# Patient Record
Sex: FEMALE | Race: WHITE | NOT HISPANIC OR LATINO | Employment: UNEMPLOYED | URBAN - METROPOLITAN AREA
[De-identification: names, ages, dates, MRNs, and addresses within clinical notes are randomized per-mention and may not be internally consistent; named-entity substitution may affect disease eponyms.]

---

## 2020-11-23 ENCOUNTER — HOSPITAL ENCOUNTER (OUTPATIENT)
Dept: URGENT CARE | Facility: CLINIC | Age: 45
Discharge: HOME | End: 2020-11-23

## 2020-11-26 LAB — BACTERIA UR CULT: NORMAL

## 2021-01-09 ENCOUNTER — HOSPITAL ENCOUNTER (OUTPATIENT)
Dept: URGENT CARE | Facility: CLINIC | Age: 46
Discharge: HOME | End: 2021-01-09
Attending: PHYSICIAN ASSISTANT

## 2021-01-11 LAB — SARS-COV-2 RNA SPEC QL NAA+PROBE: NOT DETECTED

## 2021-02-09 ENCOUNTER — HOSPITAL ENCOUNTER (OUTPATIENT)
Dept: FAMILY MEDICINE CLINIC | Facility: CLINIC | Age: 46
Discharge: HOME | End: 2021-02-09
Attending: FAMILY MEDICINE

## 2021-02-09 ENCOUNTER — OFFICE VISIT CONVERTED (OUTPATIENT)
Dept: FAMILY MEDICINE CLINIC | Facility: CLINIC | Age: 46
End: 2021-02-09
Attending: FAMILY MEDICINE

## 2021-02-09 LAB
ANION GAP SERPL CALC-SCNC: 12 MMOL/L (ref 8–19)
BASOPHILS # BLD AUTO: 0.09 10*3/UL (ref 0–0.2)
BASOPHILS NFR BLD AUTO: 1.4 % (ref 0–3)
BUN SERPL-MCNC: 10 MG/DL (ref 5–25)
BUN/CREAT SERPL: 13 {RATIO} (ref 6–20)
CALCIUM SERPL-MCNC: 9.6 MG/DL (ref 8.7–10.4)
CHLORIDE SERPL-SCNC: 96 MMOL/L (ref 99–111)
CHOLEST SERPL-MCNC: 170 MG/DL (ref 107–200)
CHOLEST/HDLC SERPL: 4.3 {RATIO} (ref 3–6)
CONV ABS IMM GRAN: 0.02 10*3/UL (ref 0–0.2)
CONV CO2: 30 MMOL/L (ref 22–32)
CONV HIV-1/ HIV-2: NONREACTIVE
CONV IMMATURE GRAN: 0.3 % (ref 0–1.8)
CREAT UR-MCNC: 0.79 MG/DL (ref 0.5–0.9)
DEPRECATED RDW RBC AUTO: 53.3 FL (ref 36.4–46.3)
EOSINOPHIL # BLD AUTO: 0.24 10*3/UL (ref 0–0.7)
EOSINOPHIL # BLD AUTO: 3.7 % (ref 0–7)
ERYTHROCYTE [DISTWIDTH] IN BLOOD BY AUTOMATED COUNT: 15.7 % (ref 11.7–14.4)
EST. AVERAGE GLUCOSE BLD GHB EST-MCNC: 117 MG/DL
GFR SERPLBLD BASED ON 1.73 SQ M-ARVRAT: >60 ML/MIN/{1.73_M2}
GLUCOSE SERPL-MCNC: 109 MG/DL (ref 65–99)
HBA1C MFR BLD: 5.7 % (ref 3.5–5.7)
HCT VFR BLD AUTO: 43.2 % (ref 37–47)
HDLC SERPL-MCNC: 40 MG/DL (ref 40–60)
HGB BLD-MCNC: 13.9 G/DL (ref 12–16)
LDLC SERPL CALC-MCNC: 82 MG/DL (ref 70–100)
LYMPHOCYTES # BLD AUTO: 1.93 10*3/UL (ref 1–5)
LYMPHOCYTES NFR BLD AUTO: 29.5 % (ref 20–45)
MAGNESIUM SERPL-MCNC: 1.84 MG/DL (ref 1.6–2.3)
MCH RBC QN AUTO: 29.8 PG (ref 27–31)
MCHC RBC AUTO-ENTMCNC: 32.2 G/DL (ref 33–37)
MCV RBC AUTO: 92.7 FL (ref 81–99)
MONOCYTES # BLD AUTO: 0.62 10*3/UL (ref 0.2–1.2)
MONOCYTES NFR BLD AUTO: 9.5 % (ref 3–10)
NEUTROPHILS # BLD AUTO: 3.65 10*3/UL (ref 2–8)
NEUTROPHILS NFR BLD AUTO: 55.6 % (ref 30–85)
NRBC CBCN: 0 % (ref 0–0.7)
OSMOLALITY SERPL CALC.SUM OF ELEC: 280 MOSM/KG (ref 273–304)
PLATELET # BLD AUTO: 324 10*3/UL (ref 130–400)
PMV BLD AUTO: 11.5 FL (ref 9.4–12.3)
POTASSIUM SERPL-SCNC: 3.4 MMOL/L (ref 3.5–5.3)
RBC # BLD AUTO: 4.66 10*6/UL (ref 4.2–5.4)
SODIUM SERPL-SCNC: 135 MMOL/L (ref 135–147)
TRIGL SERPL-MCNC: 239 MG/DL (ref 40–150)
TSH SERPL-ACNC: 2.81 M[IU]/L (ref 0.27–4.2)
VLDLC SERPL-MCNC: 48 MG/DL (ref 5–37)
WBC # BLD AUTO: 6.55 10*3/UL (ref 4.8–10.8)

## 2021-02-10 LAB — HCV AB SER DONR QL: <0.1 S/CO RATIO (ref 0–0.9)

## 2021-02-17 ENCOUNTER — OFFICE VISIT CONVERTED (OUTPATIENT)
Dept: FAMILY MEDICINE CLINIC | Facility: CLINIC | Age: 46
End: 2021-02-17
Attending: FAMILY MEDICINE

## 2021-05-07 NOTE — PROGRESS NOTES
Progress Note      Patient Name: Magi Neumann   Patient ID: [id number]   Sex: Female   Birthdate: Emily 3, 1975    Primary Care Provider: Stephany Flores DO   Referring Provider: Stephany Flores DO    Visit Date: 2021    Provider: Stephany Flores DO   Location: [facility]   Location Address: [address] [place] [address] [place]  [address]   Location Phone: [phone]          Chief Complaint     Follow up to rash       History Of Present Illness  Magi Neumann is a 45 year old /White female who presents for evaluation and treatment of:      1.) F/U LABS : The patient presents for follow up - most recent visit was a new patient visit. She presented with complaints of bilateral lower extremity numbness and tingling of her feet - she reported concern for diabetes. Review of her labs revealed blood glucose within normal limits. Her remaining labs were unremarkable except for an elevated triglyceride level + a potassium level of 3.4. Patient reports a ? history of hypokalemia - reporting 'I have been in the hospital over 50 times because my potassium was low.' She reported potassium supplementation during her last visit here in office - she was advised that she would not be started on a K supplement until labs were resulted.       Past Medical History  Disease Name Date Onset Notes   Chronic pancreatitis --  History of pancreatic stent per patient due to gallstone lodge in pancreatic duct.         Past Surgical History  Procedure Name Date Notes    --  x 2.    D and C --  ?Malignant cells   Gallbladder removal --  --    Tubal ligation --  --          Medication List  Name Date Started Instructions   hydroxyzine HCl 25 mg oral tablet 2021 take 1 tablet by oral route 2 times a day for 30 days         Allergy List  Allergen Name Date Reaction Notes   Diflucan --  --  --          Family Medical History  Disease Name Relative/Age Notes   Brain Neoplasm, Malignant Father/   --   "  Lung cancer Father/   --    Diabetes mellitus, type II Brother/  Mother/  Sister/   --          Social History  Finding Status Start/Stop Quantity Notes   Tobacco Current every day 14/-- 1 PPD --          Review of Systems  · Constitutional  o Denies  o : fever, chills  · Eyes  o Denies  o : discharge from eye, eye discomfort  · HENT  o Denies  o : lightheadedness, nasal congestion  · Cardiovascular  o Denies  o : chest pain, syncope  · Respiratory  o Denies  o : wheezing, cough  · Gastrointestinal  o Denies  o : nausea, vomiting  · Neurologic  o Denies  o : altered mental status, tingling or numbness  · Musculoskeletal  o Denies  o : muscle pain, muscle cramps  · Psychiatric  o Denies  o : depression, hallucinations      Vitals  Date Time BP Position Site L\R Cuff Size HR RR TEMP (F) WT  HT  BMI kg/m2 BSA m2 O2 Sat FR L/min FiO2 HC       02/17/2021 01:13 /68 Sitting    80 - R  97.7 135lbs 0oz 5'  4.5\" 22.81 1.67 98 %  21%          Physical Examination  · Constitutional  o Appearance  o : alert, in no acute distress, dirty appearance   · Eyes  o Conjunctivae  o : conjunctivae normal  · Neck  o Inspection/Palpation  o : supple  · Respiratory  o Respiratory Effort  o : breathing unlabored  o Auscultation of Lungs  o : clear to ascultation  · Cardiovascular  o Heart  o :   § Auscultation of Heart  § : regular rate and rhythm  o Peripheral Vascular System  o :   § Extremities  § : no edema  · Skin and Subcutaneous Tissue  o General Inspection  o : no rash appreciated   · Neurologic  o Gait and Station  o :   § Gait Screening  § : normal gait  · Psychiatric  o Mood and Affect  o : mood normal, affect appropriate          Assessment  · Hypertriglyceridemia     272.1/E78.1    A.) Advised a healthy diet and exercise.     · Hypokalemia     276.8/E87.6    A.) Will start on low dose K supplementation.     · Muscle cramps     729.82/R25.2    A.) Along with numbness and tingling the patient is c/o of muscle cramps - " muscle relaxer as noted. Recommend referral to neurology for parasthesia sxs, which the patient declined.         Plan  · Orders  o ACO-17: Screened for tobacco use AND received tobacco cessation intervention (4004F) - - 02/17/2021  o ACO-39: Current medications updated and reviewed (, 1159F) - - 02/17/2021  · Medications  o potassium chloride 8 mEq oral tablet extended release   SIG: take 1 tablet (8 meq) by oral route once daily for 90 days   DISP: (90) Tablet with 1 refills  Prescribed on 02/17/2021     o cyclobenzaprine 5 mg oral tablet   SIG: take 1 tablet by oral route daily PRN   DISP: (60) Tablet with 1 refills  Prescribed on 02/17/2021     o Medications have been Reconciled  o Transition of Care or Provider Policy  · Instructions  o Take all medications as prescribed/directed.  o Patient was educated/instructed on their diagnosis, treatment and medications prior to discharge from the clinic today.  o FOLLOW UP IN 6 MONTHS.            Electronically Signed by: Stephany Flores DO - on February 17, 2021 02:38:59 PM

## 2021-05-07 NOTE — PROGRESS NOTES
"   Progress Note      Patient Name: Magi Neumann   Patient ID: [id number]   Sex: Female   Birthdate: Emily 3, 1975        Visit Date: 2021    Provider: Stephany Flores DO   Location: [facility]   Location Address: [address] [place] [address] [place]  [address]   Location Phone: [phone]          Chief Complaint     Rash all over for a \"very long time\" and has issues with legs/feet burning/pain/tingling.       History Of Present Illness  Magi Neumann is a 45 year old /White female who presents for evaluation and treatment of:      1.) ESTABLISH CARE : No recent primary care. Presents with a past medical history of chronic pancreatitis.    2.) RASH : Onset 1 year ago. Diffuse. Multiple trips to the ER - per patient, she was treated with creams (?abx). She reports intermittent relief. (+) Pruritic lesion. She reports multiple showers a day due to pruritus - she is unclear regarding showers provided symptomatic. Reports improvement of her sxs when she 'leaves her house.'     3.) ?NEUROPATHIC PAIN OF LE : Onset - months ago. Reports sxs of bilateral feet and legs. Reports intemittent cramps and 'knots.' Reports intermittent numbness of the plantar aspect of her bilateral feet.     4.)  PREVENTIVE CARE : . Most recent pap test was completed 5 years ago with eventual D & C. Due for labs.       Past Medical History  Disease Name Date Onset Notes   Chronic pancreatitis --  History of pancreatic stent per patient due to gallstone lodge in pancreatic duct.         Past Surgical History  Procedure Name Date Notes    --  x 2.    D and C --  ?Malignant cells   Gallbladder removal --  --    Tubal ligation --  --          Allergy List  Allergen Name Date Reaction Notes   Diflucan --  --  --        Allergies Reconciled  Social History  Finding Status Start/Stop Quantity Notes   Tobacco Current every day 14/-- 1 PPD --          Review of Systems  · Constitutional  o Denies  o : " "fatigue, night sweats  · Eyes  o Denies  o : double vision, blurred vision  · HENT  o Denies  o : vertigo, recent head injury  · Cardiovascular  o Denies  o : chest pain, irregular heart beats  · Respiratory  o Denies  o : shortness of breath, productive cough  · Gastrointestinal  o Denies  o : nausea, vomiting  · Genitourinary  o Denies  o : dysuria, urinary retention  · Integument  o Admits  o : rash, itching  · Neurologic  o Denies  o : altered mental status, seizures  · Musculoskeletal  o Admits  o : bilateral foot pain, neuropathic pain of b/l lower extremities  o Denies  o : joint swelling, limitation of motion  · Endocrine  o Denies  o : cold intolerance, heat intolerance  · Psychiatric  o Admits  o : anxiety, depression, difficulty sleeping  · Heme-Lymph  o Denies  o : petechiae, lymph node enlargement or tenderness  · Allergic-Immunologic  o Denies  o : frequent illnesses      Vitals  Date Time BP Position Site L\R Cuff Size HR RR TEMP (F) WT  HT  BMI kg/m2 BSA m2 O2 Sat FR L/min FiO2        02/09/2021 10:13 /80 Sitting    79 - R  97.1 139lbs 2oz 5'  4.5\" 23.51 1.69 97 %  21%          Physical Examination  · Constitutional  o Appearance  o : alert, in no acute distress, normal body habitus  · Eyes  o Conjunctivae  o : conjunctivae normal  · Ears, Nose, Mouth and Throat  o Ears  o :   § External Ears  § : appearance within normal limits, no lesions present  § Otoscopic Examination  § : tympanic membrane appearance within normal limits bilaterally  § Hearing  § : intact to conversational voice both ears  o Nose  o :   § External Nose  § : appearance normal  o Oral Cavity  o :   § Oral Mucosa  § : oral mucosa normal without pallor or cyanosis  § Lips  § : lip appearance normal  § Teeth  § : normal dentition for age  § Gums  § : gums pink, non-swollen, no bleeding present  § Tongue  § : tongue appearance normal  § Palate  § : hard palate normal, soft palate appearance normal  o Throat  o : "   § Oropharynx  § : no inflammation appreciated  · Neck  o Inspection/Palpation  o : supple  · Respiratory  o Respiratory Effort  o : breathing unlabored  o Auscultation of Lungs  o : clear to ascultation  · Cardiovascular  o Heart  o :   § Auscultation of Heart  § : regular rate and rhythm  o Peripheral Vascular System  o :   § Extremities  § : no edema  · Musculoskeletal  o General  o :   § General Musculoskeletal  § : no joint swelling appreciated   · Skin and Subcutaneous Tissue  o General Inspection  o : diffuse lesions appreciated - erythematous w/ skin breakdown - at least one irregular hyperpigmented lesion of right upper back region measuring approximately .5 cm in diameter  · Neurologic  o Gait and Station  o :   § Gait Screening  § : normal gait  · Psychiatric  o Mood and Affect  o : bizarre              Assessment  · Dermatitis     692.9/L30.9    A.) Hydroxyzine as noted - will refer to dermatology for an evaluation and recommendations.     · Insomnia, unspecified     780.52/G47.00    A.) Will workup during next visit.     · Establishing care with new doctor, encounter for     V65.8/Z76.89    A.) Labs as noted below. Follow up in 1 week for review.     · Neuropathic pain     729.2/M79.2    A.) A1C as noted.     · Screening for diabetes mellitus     V77.1/Z13.1    A.) A1C as noted.     · Screening for thyroid disorder     V77.0/Z13.29    A.) TSH as noted.     · Encounter for hepatitis C screening test for low risk patient     V73.89/Z11.59    A.) Hepatitis C antibody as noted.     · Screening for lipid disorders     V77.91/Z13.220    A.) Lipid panel as noted.     · Screening for cervical cancer     V76.2/Z12.4    A.) Schedule Well Woman Exam.     · Medication management     V58.69/Z79.899    A.) Labs as noted.     · Screening for HIV (human immunodeficiency virus)     V73.89/Z11.4    A.) HIV screening test as noted.     · Anxiety and depression       Anxiety disorder, unspecified     300.00/F41.9  Major  depressive disorder, single episode, unspecified     300.00/F32.9    A.) Start Hydroxyzine - will discuss further during follow up visit.     · Allergic dermatitis     692.9/L23.9    A.) Will refer to allergy for an evaluation and recommendations.     · Bilateral foot pain       Pain in right foot     729.5/M79.671  Pain in left foot     729.5/M79.672    A.) Will refer to podiatry for an evaluation and recommendations.      Problems Reconciled  Plan  · Orders  o BMP [facility] ([id number]) - V58.69/Z79.899 - 02/09/2021  o CBC with Auto Diff [facility] ([id number]) - V58.69/Z79.899 - 02/09/2021  o Hgb A1c [facility] ([id number]) - V77.1/Z13.1 - 02/09/2021  o Lipid Panel [facility] ([id number]) - V77.91/Z13.220 - 02/09/2021  o TSH [facility] ([id number]) - V77.0/Z13.29 - 02/09/2021  o ACO-39: Current medications updated and reviewed (1159F, ) - - 02/09/2021  o Hepatitis C antibody (44214) - V73.89/Z11.59 - 02/09/2021  o HIV Screen by EIA [facility] ([id number], ) - V73.89/Z11.4 - 02/09/2021  o Magnesium level (39930) - 729.2/M79.2 - 02/09/2021  o DERMATOLOGY CONSULTATION (DERMA) - 692.9/L30.9 - 02/09/2021  o ALLERGY CONSULTATION (ALLEG) - 692.9/L23.9 - 02/09/2021  o PODIATRY CONSULTATION (PODIA) - 729.5/M79.671, 729.5/M79.672 - 02/09/2021  · Medications  o hydroxyzine HCl 25 mg oral tablet   SIG: take 1 tablet by oral route 2 times a day for 30 days   DISP: (60) Tablet with 0 refills  Prescribed on 02/09/2021     o Medications have been Reconciled  o Transition of Care or Provider Policy  · Instructions  o Take all medications as prescribed/directed.  o Patient was educated/instructed on their diagnosis, treatment and medications prior to discharge from the clinic today.  o FOLLOW UP IN 1 MONTH.            Electronically Signed by: Stephany Flores DO - on February 9, 2021 11:36:02 AM

## 2021-05-09 VITALS
WEIGHT: 135 LBS | HEART RATE: 80 BPM | SYSTOLIC BLOOD PRESSURE: 110 MMHG | OXYGEN SATURATION: 98 % | BODY MASS INDEX: 23.05 KG/M2 | HEIGHT: 64 IN | TEMPERATURE: 97.7 F | DIASTOLIC BLOOD PRESSURE: 68 MMHG

## 2021-05-09 VITALS
TEMPERATURE: 97.1 F | WEIGHT: 139.12 LBS | BODY MASS INDEX: 23.75 KG/M2 | SYSTOLIC BLOOD PRESSURE: 118 MMHG | HEART RATE: 79 BPM | HEIGHT: 64 IN | OXYGEN SATURATION: 97 % | DIASTOLIC BLOOD PRESSURE: 80 MMHG

## 2021-05-11 ENCOUNTER — HOSPITAL ENCOUNTER (OUTPATIENT)
Dept: FAMILY MEDICINE CLINIC | Facility: CLINIC | Age: 46
Discharge: HOME | End: 2021-05-11
Attending: NURSE PRACTITIONER

## 2021-05-11 ENCOUNTER — OFFICE VISIT CONVERTED (OUTPATIENT)
Dept: FAMILY MEDICINE CLINIC | Facility: CLINIC | Age: 46
End: 2021-05-11
Attending: NURSE PRACTITIONER

## 2021-06-05 NOTE — PROGRESS NOTES
"   Progress Note      Patient Name: Magi Neumann   Patient ID: [id number]   Sex: Female   Birthdate: Emily 3, 1975    Primary Care Provider: Stephany Flores DO   Referring Provider: Stephany Flores DO    Visit Date: May 11, 2021    Provider: YULI Orosco   Location: [facility]   Location Address: [address] [place] [address]   [place]  [address]   Location Phone: [phone]          Chief Complaint     PATIENT IS HERE FOR A RASH ALL OVER HER BODY, SHE HAS AN APT WITH A DERM SPECALISTY BUT NOT TIL THE END OF JUNE.      SHE IS ALSO HAVING LIMITED MOVEMENT, PAIN, SWELLING AND PAIN SHOOTING DOWN HER LEFT ARM FROM HER LEFT SHOULDER.       History Of Present Illness  Magi Neumann is a 45 year old /White female who presents for evaluation and treatment of:      1) pt reports has a rash \"everywhere on my face hands arms and scarred up and buttocks and butt crack and blistered and infected\"--and reports burning --pt reports had this on and off x 1 yr--never seen derm for this as yet and does have an appoint end of next month--6/22/21--no one else with the rash in the home --does have indoor an outdoor pets     pt reports been to the ER several times and \"they never know what it is\"     and the allergist 6/9/21    2) pt also reports 1-2 weeks the left shoulder pain and \"feels like out of socket\"--pt denies an injury--just awakened with it --IB and some Tylenol and heat pad--but does have 3 grandchildren at home she has custody of and does lift them       and also will need refills on the flexeril --    3) pt reports was supposed to be in court yesterday in [facility] for a pre-trial for the grandchildren--and pt reports was unable to drive and now they rescheduled her to be there tomorrow and  will  her there--pt then reports needing a note for this missed court date          Past Medical History  Disease Name Date Onset Notes   Chronic pancreatitis --  History of pancreatic stent per patient " "due to gallstone lodge in pancreatic duct.         Past Surgical History  Procedure Name Date Notes    --  x 2.    D and C --  ?Malignant cells   Gallbladder removal --  --    Tubal ligation --  --          Medication List  Name Date Started Instructions   cyclobenzaprine 5 mg oral tablet 2021 take 1 tablet by oral route daily PRN   hydroxyzine HCl 25 mg oral tablet 2021 take 1 tablet by oral route 2 times a day for 30 days   potassium chloride 8 mEq oral tablet extended release 2021 take 1 tablet (8 meq) by oral route once daily for 90 days         Allergy List  Allergen Name Date Reaction Notes   Diflucan --  --  --        Allergies Reconciled  Family Medical History  Disease Name Relative/Age Notes   Brain Neoplasm, Malignant Father/   --    Lung cancer Father/   --    Diabetes mellitus, type II Brother/  Mother/  Sister/   --          Social History  Finding Status Start/Stop Quantity Notes   Tobacco Current every day 14/-- 1 PPD --          Review of Systems  · Constitutional  o Denies  o : fever  · Cardiovascular  o Denies  o : chest pain, irregular heart beats  · Respiratory  o Denies  o : shortness of breath, productive cough  · Integument  o Admits  o : rash, additional integumentary symptoms except as noted in the HPI  · Neurologic  o Denies  o : altered mental status, seizures, tremors  · Musculoskeletal  o Admits  o : muscle pain, limitation of motion, shoulder pain, additional musculoskeletal symptoms except as noted in the HPI  · Heme-Lymph  o Denies  o : petechiae, lymph node enlargement or tenderness  · Allergic-Immunologic  o Denies  o : frequent illnesses      Vitals  Date Time BP Position Site L\R Cuff Size HR RR TEMP (F) WT  HT  BMI kg/m2 BSA m2 O2 Sat FR L/min FiO2        2021 03:52 /62 Sitting    112 - R  98.1 138lbs 6oz 5'  4.25\" 23.57 1.69 93 %            Physical Examination  · Constitutional  o Appearance  o : well developed, well-nourished, in " no acute distress  · Head and Face  o HEENT  o : Unremarkable--wearing mask   · Eyes  o Conjunctivae  o : conjunctivae normal  · Neck  o Inspection/Palpation  o : supple  o Thyroid  o : no thyromegaly  · Respiratory  o Respiratory Effort  o : breathing unlabored  o Auscultation of Lungs  o : clear to ascultation  · Cardiovascular  o Heart  o :   § Auscultation of Heart  § : regular rate and rhythm  o Peripheral Vascular System  o :   § Extremities  § : no edema  · Gastrointestinal  o Abdominal Examination  o :   § Abdomen  § : soft nontender  · Lymphatic  o Neck  o : no lymphadenopathy present  · Musculoskeletal  o General  o :   § General Musculoskeletal  § : No joint swelling or deformity., Muscle tone, strength, and development grossly normal. (+) Limited ROM of the left shoulder up to the level of the shoulder and cannot reach behind her  · Skin and Subcutaneous Tissue  o General Inspection  o : skin turgor normal, texture normal--pt with multiple sites over her body with varying degrees of scabbing over the hands and arms and face and buttocks--and scars over her back and breasts   · Neurologic  o Gait and Station  o :   § Gait Screening  § : normal gait  · Psychiatric  o Mood and Affect  o : mood normal, affect appropriate  · In Office Procedures  o In Office XRay  o : shoulder xrays normal           Results     reviewed prior labs and prior visits       Assessment  · Rash     782.1/R21  · Limited range of motion (ROM) of shoulder     719.51/M25.619  possibly a bursitis--as pt reports limited ROM and the xrays were completely normal--upon exam and per radiologist report   · Shoulder pain, left     719.41/M25.512  · Bacterial skin infection       Local infection of the skin and subcutaneous tissue, unspecified     686.9/L08.9  Other specified bacterial agents as the cause of diseases classified elsewhere     686.9/B96.89      Plan  · Orders  o ACO-39: Current medications updated and reviewed (2960L, ) - -  05/11/2021  o Xray shoulder left [facility] ([id number]) - 719.51/M25.619, 719.41/M25.512 - 05/11/2021   started approx. 1-2 weeks ago and pt reports no injury stat read please   · Medications  o Bactrim -160 mg oral tablet   SIG: take 1 tablet by oral route every 12 hours for 10 days   DISP: (20) Tablet with 0 refills  Prescribed on 05/11/2021     o Medrol (Serge) 4 mg oral tablets,dose pack   SIG: take by oral route as directed per package instructions for 6 days   DISP: (1) Dose Pack with 0 refills  Prescribed on 05/11/2021     o Medications have been Reconciled  o Transition of Care or Provider Policy  · Instructions  o Take all medications as prescribed/directed.  o Patient was educated/instructed on their diagnosis, treatment and medications prior to discharge from the clinic today.  o Patient instructed to seek medical attention urgently for new or worsening symptoms.  o Call the office with any concerns or questions.  o Risks, benefits, and alternatives were discussed with the patient. The patient is aware of risks associated with: med mgt  · Disposition  o Call or Return if symptoms worsen or persist.     pt reports needing an excuse for missing court--but I explained to her I can only give her a note for today only as this is the only time I saw her and she is here today for examination --I also D/W Dr Flores and agreed can only give excuse for today only             Electronically Signed by: YULI Orosco -Author on May 11, 2021 05:28:02 PM

## 2021-07-15 VITALS
SYSTOLIC BLOOD PRESSURE: 118 MMHG | OXYGEN SATURATION: 93 % | WEIGHT: 138.37 LBS | HEIGHT: 64 IN | TEMPERATURE: 98.1 F | HEART RATE: 112 BPM | BODY MASS INDEX: 23.62 KG/M2 | DIASTOLIC BLOOD PRESSURE: 62 MMHG

## 2022-01-28 ENCOUNTER — TELEPHONE (OUTPATIENT)
Dept: FAMILY MEDICINE CLINIC | Facility: CLINIC | Age: 47
End: 2022-01-28

## 2022-01-28 ENCOUNTER — OFFICE VISIT (OUTPATIENT)
Dept: FAMILY MEDICINE CLINIC | Facility: CLINIC | Age: 47
End: 2022-01-28

## 2022-01-28 VITALS — HEART RATE: 89 BPM | TEMPERATURE: 98 F | OXYGEN SATURATION: 97 %

## 2022-01-28 DIAGNOSIS — R05.9 COUGH: ICD-10-CM

## 2022-01-28 DIAGNOSIS — R25.2 MUSCLE CRAMPS: ICD-10-CM

## 2022-01-28 DIAGNOSIS — J02.9 PHARYNGITIS, UNSPECIFIED ETIOLOGY: ICD-10-CM

## 2022-01-28 DIAGNOSIS — U07.1 COVID-19 VIRUS INFECTION: Primary | ICD-10-CM

## 2022-01-28 PROBLEM — K86.1 CHRONIC PANCREATITIS (HCC): Status: ACTIVE | Noted: 2022-01-28

## 2022-01-28 PROBLEM — F41.0 PANIC ATTACK: Status: ACTIVE | Noted: 2022-01-28

## 2022-01-28 LAB
ALBUMIN SERPL-MCNC: 3.7 G/DL (ref 3.5–5.2)
ALBUMIN/GLOB SERPL: 1.1 G/DL
ALP SERPL-CCNC: 123 U/L (ref 39–117)
ALT SERPL W P-5'-P-CCNC: 15 U/L (ref 1–33)
ANION GAP SERPL CALCULATED.3IONS-SCNC: 11.2 MMOL/L (ref 5–15)
AST SERPL-CCNC: 24 U/L (ref 1–32)
BASOPHILS # BLD AUTO: 0.02 10*3/MM3 (ref 0–0.2)
BASOPHILS NFR BLD AUTO: 0.4 % (ref 0–1.5)
BILIRUB SERPL-MCNC: 0.2 MG/DL (ref 0–1.2)
BUN SERPL-MCNC: 7 MG/DL (ref 6–20)
BUN/CREAT SERPL: 13 (ref 7–25)
CALCIUM SPEC-SCNC: 9 MG/DL (ref 8.6–10.5)
CHLORIDE SERPL-SCNC: 102 MMOL/L (ref 98–107)
CO2 SERPL-SCNC: 23.8 MMOL/L (ref 22–29)
CREAT SERPL-MCNC: 0.54 MG/DL (ref 0.57–1)
DEPRECATED RDW RBC AUTO: 44.7 FL (ref 37–54)
EOSINOPHIL # BLD AUTO: 0.07 10*3/MM3 (ref 0–0.4)
EOSINOPHIL NFR BLD AUTO: 1.4 % (ref 0.3–6.2)
ERYTHROCYTE [DISTWIDTH] IN BLOOD BY AUTOMATED COUNT: 14.1 % (ref 12.3–15.4)
GFR SERPL CREATININE-BSD FRML MDRD: 123 ML/MIN/1.73
GLOBULIN UR ELPH-MCNC: 3.3 GM/DL
GLUCOSE SERPL-MCNC: 97 MG/DL (ref 65–99)
HCT VFR BLD AUTO: 39.2 % (ref 34–46.6)
HGB BLD-MCNC: 12.9 G/DL (ref 12–15.9)
IMM GRANULOCYTES # BLD AUTO: 0.01 10*3/MM3 (ref 0–0.05)
IMM GRANULOCYTES NFR BLD AUTO: 0.2 % (ref 0–0.5)
LYMPHOCYTES # BLD AUTO: 1.87 10*3/MM3 (ref 0.7–3.1)
LYMPHOCYTES NFR BLD AUTO: 38.2 % (ref 19.6–45.3)
MAGNESIUM SERPL-MCNC: 1.9 MG/DL (ref 1.6–2.6)
MCH RBC QN AUTO: 28.4 PG (ref 26.6–33)
MCHC RBC AUTO-ENTMCNC: 32.9 G/DL (ref 31.5–35.7)
MCV RBC AUTO: 86.3 FL (ref 79–97)
MONOCYTES # BLD AUTO: 0.32 10*3/MM3 (ref 0.1–0.9)
MONOCYTES NFR BLD AUTO: 6.5 % (ref 5–12)
NEUTROPHILS NFR BLD AUTO: 2.6 10*3/MM3 (ref 1.7–7)
NEUTROPHILS NFR BLD AUTO: 53.3 % (ref 42.7–76)
NRBC BLD AUTO-RTO: 0 /100 WBC (ref 0–0.2)
PLATELET # BLD AUTO: 278 10*3/MM3 (ref 140–450)
PMV BLD AUTO: 10.9 FL (ref 6–12)
POTASSIUM SERPL-SCNC: 4.1 MMOL/L (ref 3.5–5.2)
PROT SERPL-MCNC: 7 G/DL (ref 6–8.5)
RBC # BLD AUTO: 4.54 10*6/MM3 (ref 3.77–5.28)
SODIUM SERPL-SCNC: 137 MMOL/L (ref 136–145)
T4 FREE SERPL-MCNC: 0.9 NG/DL (ref 0.93–1.7)
TSH SERPL DL<=0.05 MIU/L-ACNC: 1.41 UIU/ML (ref 0.27–4.2)
WBC NRBC COR # BLD: 4.89 10*3/MM3 (ref 3.4–10.8)

## 2022-01-28 PROCEDURE — 99214 OFFICE O/P EST MOD 30 MIN: CPT | Performed by: FAMILY MEDICINE

## 2022-01-28 PROCEDURE — 80050 GENERAL HEALTH PANEL: CPT | Performed by: FAMILY MEDICINE

## 2022-01-28 PROCEDURE — 84439 ASSAY OF FREE THYROXINE: CPT | Performed by: FAMILY MEDICINE

## 2022-01-28 PROCEDURE — 83735 ASSAY OF MAGNESIUM: CPT | Performed by: FAMILY MEDICINE

## 2022-01-28 RX ORDER — DEXAMETHASONE 6 MG/1
6 TABLET ORAL
Qty: 5 TABLET | Refills: 0 | Status: SHIPPED | OUTPATIENT
Start: 2022-01-28 | End: 2022-02-01

## 2022-01-28 RX ORDER — TIZANIDINE 2 MG/1
2 TABLET ORAL EVERY 8 HOURS PRN
Qty: 21 TABLET | Refills: 0 | Status: SHIPPED | OUTPATIENT
Start: 2022-01-28 | End: 2022-03-21

## 2022-01-28 RX ORDER — ALBUTEROL SULFATE 90 UG/1
2 AEROSOL, METERED RESPIRATORY (INHALATION) EVERY 6 HOURS PRN
Qty: 18 G | Refills: 1 | Status: SHIPPED | OUTPATIENT
Start: 2022-01-28 | End: 2022-03-21 | Stop reason: SDUPTHER

## 2022-01-28 RX ORDER — AZITHROMYCIN 250 MG/1
TABLET, FILM COATED ORAL
Qty: 6 TABLET | Refills: 0 | Status: SHIPPED | OUTPATIENT
Start: 2022-01-28 | End: 2022-03-21

## 2022-01-28 NOTE — TELEPHONE ENCOUNTER
Caller: Short, Magi    Relationship: Self    Best call back number: [phone]     What medication are you requesting: ANTIBIOTIC      What are your current symptoms: CRAMPING,RUNNING A FEVER, HEADACHE,BODY IS SORE      How long have you been experiencing symptoms: 4 DAYS     Have you had these symptoms before:    [x] Yes  [] No    Have you been treated for these symptoms before:   [x] Yes  [] No    If a prescription is needed, what is your preferred pharmacy and phone number: [facility] [address] - [phone] [facility] [phone]      Additional notes:  PLEASE CALL AND ADVISE.

## 2022-01-28 NOTE — PROGRESS NOTES
Chief Complaint  Generalized Body Aches (severe body aches, diagnosed with covid 2 days ago at [facility])    Yevgeniy Neumann presents to [facility]  Pt diagnosed with covid at [facility] 2 days ago.  Pt says she has no h/o seizures    URI   This is a new problem. The current episode started in the past 7 days. The problem has been gradually worsening. The maximum temperature recorded prior to her arrival was 100.4 - 100.9 F. Associated symptoms include congestion, coughing, headaches, joint pain and a sore throat. Pertinent negatives include no abdominal pain, chest pain, diarrhea, dysuria, ear pain, joint swelling, nausea, neck pain, plugged ear sensation, rash, rhinorrhea, sinus pain, sneezing, swollen glands, vomiting or wheezing. Associated symptoms comments: Muscle cramps.. She has tried nothing for the symptoms.       Objective   No Known Allergies    There is no immunization history on file for this patient.  History reviewed. No pertinent past medical history.   History reviewed. No pertinent surgical history.   Social History     Socioeconomic History   • Marital status:    Tobacco Use   • Smoking status: Unknown If Ever Smoked   • Smokeless tobacco: Never Used   Vaping Use   • Vaping Use: Unknown        Current Outpatient Medications:   •  albuterol sulfate  (90 Base) MCG/ACT inhaler, Inhale 2 puffs Every 6 (Six) Hours As Needed for Wheezing., Disp: 18 g, Rfl: 1  •  azithromycin (Zithromax Z-Serge) 250 MG tablet, Take 2 tablets by mouth on day 1, then 1 tablet daily on days 2-5, Disp: 6 tablet, Rfl: 0  •  dexamethasone (DECADRON) 6 MG tablet, Take 1 tablet by mouth Daily With Breakfast for 5 days., Disp: 5 tablet, Rfl: 0  •  tiZANidine (ZANAFLEX) 2 MG tablet, Take 1 tablet by mouth Every 8 (Eight) Hours As Needed for Muscle Spasms., Disp: 21 tablet, Rfl: 0   History reviewed. No pertinent family history.       Vital Signs:   Vitals:    01/28/22  1448   Pulse: 89   Temp: 98 °F (36.7 °C)   SpO2: 97%       Physical Exam  Vitals reviewed.   Constitutional:       Appearance: Normal appearance. She is well-developed.   HENT:      Head: Normocephalic and atraumatic.      Right Ear: Tympanic membrane, ear canal and external ear normal.      Left Ear: Tympanic membrane, ear canal and external ear normal.      Nose: Nose normal.      Mouth/Throat:      Mouth: Mucous membranes are moist.      Pharynx: Oropharynx is clear. Posterior oropharyngeal erythema present. No oropharyngeal exudate.   Eyes:      Conjunctiva/sclera: Conjunctivae normal.      Pupils: Pupils are equal, round, and reactive to light.   Cardiovascular:      Rate and Rhythm: Normal rate and regular rhythm.      Pulses: Normal pulses.      Heart sounds: Normal heart sounds. No murmur heard.  No friction rub. No gallop.    Pulmonary:      Effort: Pulmonary effort is normal.      Breath sounds: No wheezing or rhonchi.      Comments: Bilateral course sounds.  Abdominal:      General: Abdomen is flat. Bowel sounds are normal. There is no distension.      Palpations: Abdomen is soft. There is no mass.      Tenderness: There is no abdominal tenderness. There is no guarding or rebound.      Hernia: No hernia is present.   Musculoskeletal:         General: No swelling or tenderness. Normal range of motion.      Cervical back: Normal range of motion and neck supple.   Skin:     General: Skin is warm and dry.      Capillary Refill: Capillary refill takes less than 2 seconds.   Neurological:      General: No focal deficit present.      Mental Status: She is alert and oriented to person, place, and time.      Cranial Nerves: No cranial nerve deficit.   Psychiatric:         Mood and Affect: Mood and affect normal.         Behavior: Behavior normal.         Thought Content: Thought content normal.         Judgment: Judgment normal.        Result Review :   The following data was reviewed by: Shivam Arteaga MD on  01/28/2022:    Data reviewed: Radiologic studies I viewed and interpreted 2 views chest x-rays:NAD.          Assessment and Plan    Diagnoses and all orders for this visit:    1. COVID-19 virus infection (Primary)  -     XR Chest PA & Lateral (In Office)  -     dexamethasone (DECADRON) 6 MG tablet; Take 1 tablet by mouth Daily With Breakfast for 5 days.  Dispense: 5 tablet; Refill: 0    2. Cough  -     CBC Auto Differential  -     XR Chest PA & Lateral (In Office)  -     albuterol sulfate  (90 Base) MCG/ACT inhaler; Inhale 2 puffs Every 6 (Six) Hours As Needed for Wheezing.  Dispense: 18 g; Refill: 1    3. Muscle cramps  -     CBC Auto Differential  -     Comprehensive Metabolic Panel  -     Magnesium  -     TSH+Free T4  -     tiZANidine (ZANAFLEX) 2 MG tablet; Take 1 tablet by mouth Every 8 (Eight) Hours As Needed for Muscle Spasms.  Dispense: 21 tablet; Refill: 0    4. Pharyngitis, unspecified etiology  -     azithromycin (Zithromax Z-Serge) 250 MG tablet; Take 2 tablets by mouth on day 1, then 1 tablet daily on days 2-5  Dispense: 6 tablet; Refill: 0            Follow Up   Return in about 1 week (around 2/4/2022), or if symptoms worsen or fail to improve.  Patient was given instructions and counseling regarding her condition or for health maintenance advice. Please see specific information pulled into the AVS if appropriate.

## 2022-01-28 NOTE — PROGRESS NOTES
Venipuncture Blood Specimen Collection  Venipuncture performed in left arm by Velma Sewell with good hemostasis. Patient tolerated the procedure well without complications.   01/28/22   Velma Sewell

## 2022-01-28 NOTE — TELEPHONE ENCOUNTER
Caller: RANDOLPH MARROQUIN    Relationship:      CALLING BACK AND REQUESTING CALL    Best call back number: [phone]     What medication are you requesting: ANTIBIOTIC       What are your current symptoms: CRAMPING,RUNNING A FEVER, HEADACHE,BODY IS SORE     How long have you been experiencing symptoms: 4 DAYS      Have you had these symptoms before:                                  [x]? Yes  []? No     Have you been treated for these symptoms before:              [x]? Yes  []? No     If a prescription is needed, what is your preferred pharmacy and phone number: [address] - [phone] [facility] [phone]       Additional notes:  PLEASE CALL AND ADVISE.

## 2022-01-31 ENCOUNTER — TELEPHONE (OUTPATIENT)
Dept: FAMILY MEDICINE CLINIC | Facility: CLINIC | Age: 47
End: 2022-01-31

## 2022-01-31 NOTE — TELEPHONE ENCOUNTER
Caller: Short, Magi    Relationship: Self    Best call back number: [phone]    What medication are you requesting: SOMETHING TO HELP PATIENT BREATH    What are your current symptoms:NOSE AND CHEST CONGESTED    How long have you been experiencing symptoms: 2 DAYS    Have you had these symptoms before:    [x] Yes  [] No    Have you been treated for these symptoms before:   [x] Yes  [] No    If a prescription is needed, what is your preferred pharmacy and phone number:  [facility] [address] [place] [address] - [facility] - [phone] [facility] [phone]   [phone]    Additional notes:

## 2022-01-31 NOTE — TELEPHONE ENCOUNTER
The dexamethasone and albuterol should have helped this.  If they are not working, pt may need to be reseen.  We worry about pneumonia setting up.  Thanks.

## 2022-02-01 ENCOUNTER — OFFICE VISIT (OUTPATIENT)
Dept: FAMILY MEDICINE CLINIC | Facility: CLINIC | Age: 47
End: 2022-02-01

## 2022-02-01 VITALS — TEMPERATURE: 97.8 F | HEART RATE: 96 BPM | OXYGEN SATURATION: 96 %

## 2022-02-01 DIAGNOSIS — R05.9 COUGH: ICD-10-CM

## 2022-02-01 DIAGNOSIS — J40 BRONCHITIS: ICD-10-CM

## 2022-02-01 DIAGNOSIS — U07.1 COVID-19 VIRUS INFECTION: Primary | ICD-10-CM

## 2022-02-01 PROCEDURE — 99213 OFFICE O/P EST LOW 20 MIN: CPT | Performed by: FAMILY MEDICINE

## 2022-02-01 RX ORDER — DOXYCYCLINE HYCLATE 100 MG/1
100 CAPSULE ORAL 2 TIMES DAILY
Qty: 14 CAPSULE | Refills: 0 | Status: SHIPPED | OUTPATIENT
Start: 2022-02-01 | End: 2022-02-08

## 2022-02-01 RX ORDER — PREDNISONE 20 MG/1
60 TABLET ORAL DAILY
Qty: 15 TABLET | Refills: 0 | Status: SHIPPED | OUTPATIENT
Start: 2022-02-01 | End: 2022-02-06

## 2022-02-01 RX ORDER — GUAIFENESIN 600 MG/1
600 TABLET, EXTENDED RELEASE ORAL 2 TIMES DAILY
Qty: 20 TABLET | Refills: 0 | Status: SHIPPED | OUTPATIENT
Start: 2022-02-01 | End: 2022-02-11

## 2022-02-01 NOTE — PROGRESS NOTES
Chief Complaint  Cough (feels like pneumonia, she was diagnosed with covid last week)    Subjective          Magi Neumann presents to [facility]  Pt was diagnosed with covid last week and continues to have a lot of dry cough and SOA- pt is finishing up z-pack and dexamethasone as of today and is on albuterol and symbicort- no relief    URI   This is a new problem. Episode onset: 10 days. The problem has been gradually worsening. The maximum temperature recorded prior to her arrival was 100.4 - 100.9 F. Associated symptoms include congestion, coughing, headaches, joint pain and a sore throat. Pertinent negatives include no abdominal pain, chest pain, diarrhea, dysuria, ear pain, joint swelling, nausea, neck pain, plugged ear sensation, rash, rhinorrhea, sinus pain, sneezing, swollen glands, vomiting or wheezing. Treatments tried: z-pack, dexamethasone. The treatment provided mild relief.       Objective   No Known Allergies    There is no immunization history on file for this patient.  History reviewed. No pertinent past medical history.   History reviewed. No pertinent surgical history.   Social History     Socioeconomic History   • Marital status:    Tobacco Use   • Smoking status: Unknown If Ever Smoked   • Smokeless tobacco: Never Used   Vaping Use   • Vaping Use: Unknown        Current Outpatient Medications:   •  albuterol sulfate  (90 Base) MCG/ACT inhaler, Inhale 2 puffs Every 6 (Six) Hours As Needed for Wheezing., Disp: 18 g, Rfl: 1  •  azithromycin (Zithromax Z-Serge) 250 MG tablet, Take 2 tablets by mouth on day 1, then 1 tablet daily on days 2-5, Disp: 6 tablet, Rfl: 0  •  tiZANidine (ZANAFLEX) 2 MG tablet, Take 1 tablet by mouth Every 8 (Eight) Hours As Needed for Muscle Spasms., Disp: 21 tablet, Rfl: 0  •  doxycycline (VIBRAMYCIN) 100 MG capsule, Take 1 capsule by mouth 2 (Two) Times a Day for 7 days., Disp: 14 capsule, Rfl: 0  •  guaiFENesin (Mucinex) 600 MG  12 hr tablet, Take 1 tablet by mouth 2 (Two) Times a Day for 10 days., Disp: 20 tablet, Rfl: 0  •  predniSONE (DELTASONE) 20 MG tablet, Take 3 tablets by mouth Daily for 5 days., Disp: 15 tablet, Rfl: 0   History reviewed. No pertinent family history.       Vital Signs:   Vitals:    02/01/22 1049   Pulse: 96   Temp: 97.8 °F (36.6 °C)   SpO2: 96%       Physical Exam  Vitals reviewed.   Constitutional:       Appearance: Normal appearance. She is well-developed.   HENT:      Head: Normocephalic and atraumatic.      Right Ear: Tympanic membrane, ear canal and external ear normal.      Left Ear: Tympanic membrane, ear canal and external ear normal.      Nose: Nose normal.      Mouth/Throat:      Mouth: Mucous membranes are moist.      Pharynx: Oropharynx is clear. Posterior oropharyngeal erythema present. No oropharyngeal exudate.   Eyes:      Conjunctiva/sclera: Conjunctivae normal.      Pupils: Pupils are equal, round, and reactive to light.   Cardiovascular:      Rate and Rhythm: Normal rate and regular rhythm.      Pulses: Normal pulses.      Heart sounds: Normal heart sounds. No murmur heard.  No friction rub. No gallop.    Pulmonary:      Effort: Pulmonary effort is normal.      Breath sounds: No wheezing or rhonchi.      Comments: Bilateral course sounds.  Abdominal:      General: Abdomen is flat. Bowel sounds are normal. There is no distension.      Palpations: Abdomen is soft. There is no mass.      Tenderness: There is no abdominal tenderness. There is no guarding or rebound.      Hernia: No hernia is present.   Musculoskeletal:         General: Normal range of motion.      Cervical back: Normal range of motion and neck supple.   Skin:     General: Skin is warm and dry.      Capillary Refill: Capillary refill takes less than 2 seconds.   Neurological:      General: No focal deficit present.      Mental Status: She is alert and oriented to person, place, and time.      Cranial Nerves: No cranial nerve deficit.    Psychiatric:         Mood and Affect: Mood and affect normal.         Behavior: Behavior normal.         Thought Content: Thought content normal.         Judgment: Judgment normal.        Result Review :   The following data was reviewed by: Shivam Arteaga MD on 02/01/2022:    Data reviewed: Radiologic studies I viewed and interpreted 2 views chest x-rays:NAD.          Assessment and Plan    Diagnoses and all orders for this visit:    1. COVID-19 virus infection (Primary)  -     XR Chest PA & Lateral (In Office)    2. Cough  -     XR Chest PA & Lateral (In Office)    3. Bronchitis  -     predniSONE (DELTASONE) 20 MG tablet; Take 3 tablets by mouth Daily for 5 days.  Dispense: 15 tablet; Refill: 0  -     guaiFENesin (Mucinex) 600 MG 12 hr tablet; Take 1 tablet by mouth 2 (Two) Times a Day for 10 days.  Dispense: 20 tablet; Refill: 0  -     doxycycline (VIBRAMYCIN) 100 MG capsule; Take 1 capsule by mouth 2 (Two) Times a Day for 7 days.  Dispense: 14 capsule; Refill: 0            Follow Up   Return in about 1 week (around 2/8/2022), or if symptoms worsen or fail to improve.  Patient was given instructions and counseling regarding her condition or for health maintenance advice. Please see specific information pulled into the AVS if appropriate.

## 2022-02-15 ENCOUNTER — TELEPHONE (OUTPATIENT)
Dept: FAMILY MEDICINE CLINIC | Facility: CLINIC | Age: 47
End: 2022-02-15

## 2022-02-15 NOTE — TELEPHONE ENCOUNTER
Caller: Thien Magi    Relationship: Self    Best call back number: [phone]    Requested Prescriptions: POTASSIUM AND ANTIBIOTIC        Pharmacy where request should be sent: [facility], [place] - [address] [place] [address] [phone] [facility] [phone] FX     Additional details provided by patient: PATIENT IS NEEDING MEDICATION NOT LISTED    Does the patient have less than a 3 day supply:  [x] Yes  [] No    Bo Robbins Rep   02/15/22 16:18 EST

## 2022-02-16 RX ORDER — POTASSIUM CHLORIDE 600 MG/1
8 TABLET, FILM COATED, EXTENDED RELEASE ORAL DAILY
Qty: 30 TABLET | Refills: 0 | Status: SHIPPED | OUTPATIENT
Start: 2022-02-16 | End: 2022-02-17 | Stop reason: SDUPTHER

## 2022-02-16 NOTE — TELEPHONE ENCOUNTER
· potassium chloride 8 mEq oral tablet extended release   SIG: take 1 tablet (8 meq) by oral route once daily for 90 days   DISP: (90) Tablet with 1 refills  IS MEDICATION SHE IS NEEDING LAST GAVE BY DR. GATES 02/2021. SHE STATES SHE TAKES AND HASN'T NEEDED REFILLS UNTIL NOW, SHE IS OUT COMPLETELY.

## 2022-02-17 DIAGNOSIS — E87.6 HYPOKALEMIA: Primary | ICD-10-CM

## 2022-02-17 RX ORDER — POTASSIUM CHLORIDE 600 MG/1
8 TABLET, FILM COATED, EXTENDED RELEASE ORAL DAILY
Qty: 30 TABLET | Refills: 0 | Status: SHIPPED | OUTPATIENT
Start: 2022-02-17 | End: 2022-05-18 | Stop reason: SDUPTHER

## 2022-02-24 ENCOUNTER — OFFICE VISIT (OUTPATIENT)
Dept: OBSTETRICS AND GYNECOLOGY | Facility: CLINIC | Age: 47
End: 2022-02-24

## 2022-02-24 VITALS
WEIGHT: 142 LBS | DIASTOLIC BLOOD PRESSURE: 75 MMHG | SYSTOLIC BLOOD PRESSURE: 128 MMHG | BODY MASS INDEX: 22.82 KG/M2 | HEART RATE: 100 BPM | HEIGHT: 66 IN

## 2022-02-24 DIAGNOSIS — Z12.31 ENCOUNTER FOR SCREENING MAMMOGRAM FOR MALIGNANT NEOPLASM OF BREAST: ICD-10-CM

## 2022-02-24 DIAGNOSIS — Z12.11 SCREENING FOR COLON CANCER: ICD-10-CM

## 2022-02-24 DIAGNOSIS — Z11.3 SCREEN FOR STD (SEXUALLY TRANSMITTED DISEASE): ICD-10-CM

## 2022-02-24 DIAGNOSIS — Z01.419 WELL WOMAN EXAM: Primary | ICD-10-CM

## 2022-02-24 DIAGNOSIS — N95.1 MENOPAUSAL SYMPTOMS: ICD-10-CM

## 2022-02-24 LAB
CANDIDA SPECIES: NEGATIVE
DEPRECATED RDW RBC AUTO: 44.1 FL (ref 37–54)
ERYTHROCYTE [DISTWIDTH] IN BLOOD BY AUTOMATED COUNT: 14.2 % (ref 12.3–15.4)
GARDNERELLA VAGINALIS: NEGATIVE
HBV SURFACE AG SERPL QL IA: NORMAL
HCT VFR BLD AUTO: 42.1 % (ref 34–46.6)
HCV AB SER DONR QL: NORMAL
HGB BLD-MCNC: 14.3 G/DL (ref 12–15.9)
HIV1+2 AB SER QL: NORMAL
MCH RBC QN AUTO: 29.2 PG (ref 26.6–33)
MCHC RBC AUTO-ENTMCNC: 34 G/DL (ref 31.5–35.7)
MCV RBC AUTO: 85.9 FL (ref 79–97)
PLATELET # BLD AUTO: 306 10*3/MM3 (ref 140–450)
PMV BLD AUTO: 11.4 FL (ref 6–12)
RBC # BLD AUTO: 4.9 10*6/MM3 (ref 3.77–5.28)
T VAGINALIS DNA VAG QL PROBE+SIG AMP: NEGATIVE
T4 FREE SERPL-MCNC: 1 NG/DL (ref 0.93–1.7)
TSH SERPL DL<=0.05 MIU/L-ACNC: 1.29 UIU/ML (ref 0.27–4.2)
WBC NRBC COR # BLD: 9.55 10*3/MM3 (ref 3.4–10.8)

## 2022-02-24 PROCEDURE — 87591 N.GONORRHOEAE DNA AMP PROB: CPT | Performed by: NURSE PRACTITIONER

## 2022-02-24 PROCEDURE — 87660 TRICHOMONAS VAGIN DIR PROBE: CPT | Performed by: NURSE PRACTITIONER

## 2022-02-24 PROCEDURE — 99396 PREV VISIT EST AGE 40-64: CPT | Performed by: NURSE PRACTITIONER

## 2022-02-24 PROCEDURE — 85027 COMPLETE CBC AUTOMATED: CPT | Performed by: NURSE PRACTITIONER

## 2022-02-24 PROCEDURE — 86592 SYPHILIS TEST NON-TREP QUAL: CPT | Performed by: NURSE PRACTITIONER

## 2022-02-24 PROCEDURE — G0123 SCREEN CERV/VAG THIN LAYER: HCPCS | Performed by: NURSE PRACTITIONER

## 2022-02-24 PROCEDURE — 86803 HEPATITIS C AB TEST: CPT | Performed by: NURSE PRACTITIONER

## 2022-02-24 PROCEDURE — 87480 CANDIDA DNA DIR PROBE: CPT | Performed by: NURSE PRACTITIONER

## 2022-02-24 PROCEDURE — 87340 HEPATITIS B SURFACE AG IA: CPT | Performed by: NURSE PRACTITIONER

## 2022-02-24 PROCEDURE — 84439 ASSAY OF FREE THYROXINE: CPT | Performed by: NURSE PRACTITIONER

## 2022-02-24 PROCEDURE — G0432 EIA HIV-1/HIV-2 SCREEN: HCPCS | Performed by: NURSE PRACTITIONER

## 2022-02-24 PROCEDURE — 84443 ASSAY THYROID STIM HORMONE: CPT | Performed by: NURSE PRACTITIONER

## 2022-02-24 PROCEDURE — 87510 GARDNER VAG DNA DIR PROBE: CPT | Performed by: NURSE PRACTITIONER

## 2022-02-24 PROCEDURE — 87491 CHLMYD TRACH DNA AMP PROBE: CPT | Performed by: NURSE PRACTITIONER

## 2022-02-24 RX ORDER — ESTRADIOL 0.1 MG/G
1 CREAM VAGINAL 2 TIMES WEEKLY
Qty: 42 G | Refills: 5 | Status: SHIPPED | OUTPATIENT
Start: 2022-02-24 | End: 2022-10-06

## 2022-02-24 RX ORDER — ESCITALOPRAM OXALATE 10 MG/1
10 TABLET ORAL DAILY
Qty: 45 TABLET | Refills: 0 | Status: SHIPPED | OUTPATIENT
Start: 2022-02-24 | End: 2022-03-21

## 2022-02-24 NOTE — PROGRESS NOTES
"  HPI:   46 y.o..Presents for well woman exam. Contraception or HRT: Contraception:  Tubal ligation  Menses:   No menses 6 months, experiencing hot flashes daily that affect her quality of life. Desires to quit smoking.   Pain:  Mild, OTC meds control discomfort  Last pap abnormal   Complaints: desires treatment for hot flashes, white vaginal discharge and painful intercourse  Patient reports that she is not currently experiencing any symptoms of urinary incontinence.      Past Medical History:   Diagnosis Date   • Chronic pancreatitis (HCC)    • COPD (chronic obstructive pulmonary disease) (HCC)       Past Surgical History:   Procedure Laterality Date   •  SECTION      x2   • CHOLECYSTECTOMY     • D & C WITH SUCTION      x2   • PANCREAS SURGERY     • TUBAL ABDOMINAL LIGATION        History reviewed. No pertinent family history.     PCP: does manage PMHx and preventative labs      PHYSICAL EXAM: Chaperone present /75   Pulse 100   Ht 167.6 cm (66\")   Wt 64.4 kg (142 lb)   LMP  (LMP Unknown)   Breastfeeding No   BMI 22.92 kg/m²   General- NAD, alert and oriented, appropriate  Psych- Normal mood, good memory  Neck- No masses, no thyroid enlargement  Lymphatic- No palpable neck, axillary, or groin nodes  CV- Regular rhythm, no murmurs  Resp- CTA to bases, no wheezes  Abdomen- Soft, non distended, non tender, no masses  Breast left-  Bilaterally symmetrical, no masses, non tender, no nipple discharge  Breast right- Bilaterally symmetrical, no masses, non tender, no nipple discharge  External genitalia- Normal female, no lesions  Urethra/meatus- Normal, no masses, non tender, no prolapse  Bladder- Normal, no masses, non tender, no prolapse  Vagina- Normal, no atrophy, no lesions, no discharge, no prolapse  Cvx- Normal, no lesions, no discharge, No cervical motion tenderness  Uterus- Normal size, shape & consistency.  Non tender, mobile, & no prolapse  Adnexa- No mass, non " tender  Anus/Rectum/Perineum- Not performed  Ext- No edema, no cyanosis    Skin- No lesions, no rashes, no acanthosis nigricans      ASSESSMENT and PLAN:    Diagnoses and all orders for this visit:    1. Well woman exam (Primary)  -     IGP, CtNg, Rfx Aptima HPV ASCU    2. Menopausal symptoms  -     T4, Free  -     TSH  -     CBC (No Diff)  -     escitalopram (Lexapro) 10 MG tablet; Take 1 tablet by mouth Daily for 45 days.  Dispense: 45 tablet; Refill: 0  -     estradiol (ESTRACE) 0.1 MG/GM vaginal cream; Insert 1 g into the vagina 2 (Two) Times a Week. Insert 1 gram into the vaginal every night for 2 weeks then twice weekly  Dispense: 42 g; Refill: 5    3. Encounter for screening mammogram for malignant neoplasm of breast  -     Mammo Screening Digital Tomosynthesis Bilateral With CAD; Future    4. Screen for STD (sexually transmitted disease)  -     RPR, Rfx Qn RPR / Confirm TP  -     Hepatitis B Surface Antigen  -     Hepatitis C Antibody  -     HIV-1 / O / 2 Ag / Antibody 4th Generation  -     Gardnerella vaginalis, Trichomonas vaginalis, Candida albicans, DNA - Swab, Vagina    5. Screening for colon cancer  -     Ambulatory Referral For Screening Colonoscopy      Counseling:     HRT R/B/A/SE/E all options including non-FDA approved options reviewed    Preventative:   BREAST HEALTH- Monthly self breast exam importance and how to reviewed. MMG and/or MRI (prn) reviewed per society guidelines and her individual history. Screen: Updated today.  CERVICAL CANCER Screening- Reviewed current ASCCP guidelines for screening w and wo cotest HPV, age specific.  Screen: Updated today.  COLON CANCER Screening- Reviewed current medical society guidelines and options.  Screen:  Updated today.  BONE HEALTH- Reviewed current medical society guidelines and options for testing, calcium and vit D intake.  Weight bearing exercise.  DEXA: Already up to date.  SMOKING STATUS- pt does use nicotine and/or tobacco.  I reviewed  importance of avoiding.  Options to quit provided per [employer] protocols.  Recommend FU w PCP regarding quitting options if unable to quit wo assistance.  Does.  qualify for low dose CT of chest (age 50-80, 20pack yr hx, current or former smoker, quit w/in last 15yrs, no symptoms of lung CA), ordered.  Myriad: Does not qualify.    She understands the importance of having any ordered tests to be performed in a timely fashion.  The risks of not performing them include, but are not limited to, advanced cancer stages, bone loss from osteoporosis and/or subsequent increase in morbidity and/or mortality.  She is encouraged to review her results online and/or contact or office if she has questions.     Follow Up:  Return for 6 weeks follow up.        Mile Gutiérrez, APRN  02/24/2022

## 2022-02-26 LAB — RPR SER QL: NON REACTIVE

## 2022-03-01 LAB
C TRACH RRNA CVX QL NAA+PROBE: NEGATIVE
CONV .: NORMAL
CYTOLOGIST CVX/VAG CYTO: NORMAL
CYTOLOGY CVX/VAG DOC CYTO: NORMAL
CYTOLOGY CVX/VAG DOC THIN PREP: NORMAL
DX ICD CODE: NORMAL
HIV 1 & 2 AB SER-IMP: NORMAL
N GONORRHOEA RRNA CVX QL NAA+PROBE: NEGATIVE
OTHER STN SPEC: NORMAL
STAT OF ADQ CVX/VAG CYTO-IMP: NORMAL

## 2022-03-02 ENCOUNTER — TRANSCRIBE ORDERS (OUTPATIENT)
Dept: ADMINISTRATIVE | Facility: HOSPITAL | Age: 47
End: 2022-03-02

## 2022-03-02 DIAGNOSIS — Z12.31 VISIT FOR SCREENING MAMMOGRAM: Primary | ICD-10-CM

## 2022-03-21 ENCOUNTER — OFFICE VISIT (OUTPATIENT)
Dept: FAMILY MEDICINE CLINIC | Facility: CLINIC | Age: 47
End: 2022-03-21

## 2022-03-21 VITALS
WEIGHT: 141 LBS | DIASTOLIC BLOOD PRESSURE: 78 MMHG | TEMPERATURE: 98 F | BODY MASS INDEX: 22.76 KG/M2 | HEART RATE: 106 BPM | SYSTOLIC BLOOD PRESSURE: 110 MMHG | OXYGEN SATURATION: 93 %

## 2022-03-21 DIAGNOSIS — J40 BRONCHITIS: ICD-10-CM

## 2022-03-21 DIAGNOSIS — R05.9 COUGH: Primary | ICD-10-CM

## 2022-03-21 LAB
EXPIRATION DATE: NORMAL
EXPIRATION DATE: NORMAL
FLUAV AG UPPER RESP QL IA.RAPID: NOT DETECTED
FLUBV AG UPPER RESP QL IA.RAPID: NOT DETECTED
INTERNAL CONTROL: NORMAL
INTERNAL CONTROL: NORMAL
Lab: NORMAL
Lab: NORMAL
S PYO AG THROAT QL: NEGATIVE
SARS-COV-2 AG UPPER RESP QL IA.RAPID: NOT DETECTED

## 2022-03-21 PROCEDURE — 87428 SARSCOV & INF VIR A&B AG IA: CPT | Performed by: FAMILY MEDICINE

## 2022-03-21 PROCEDURE — 99213 OFFICE O/P EST LOW 20 MIN: CPT | Performed by: FAMILY MEDICINE

## 2022-03-21 PROCEDURE — 87880 STREP A ASSAY W/OPTIC: CPT | Performed by: FAMILY MEDICINE

## 2022-03-21 RX ORDER — GABAPENTIN 300 MG/1
300 CAPSULE ORAL 3 TIMES DAILY
COMMUNITY
Start: 2022-01-24

## 2022-03-21 RX ORDER — FLUTICASONE PROPIONATE 50 MCG
2 SPRAY, SUSPENSION (ML) NASAL DAILY
COMMUNITY
Start: 2022-01-24 | End: 2023-01-10 | Stop reason: SDUPTHER

## 2022-03-21 RX ORDER — AZITHROMYCIN 250 MG/1
TABLET, FILM COATED ORAL
Qty: 6 TABLET | Refills: 0 | OUTPATIENT
Start: 2022-03-21 | End: 2022-04-12

## 2022-03-21 RX ORDER — BUDESONIDE AND FORMOTEROL FUMARATE DIHYDRATE 160; 4.5 UG/1; UG/1
2 AEROSOL RESPIRATORY (INHALATION)
Qty: 10.2 G | Refills: 5 | Status: SHIPPED | OUTPATIENT
Start: 2022-03-21 | End: 2022-10-03

## 2022-03-21 RX ORDER — FAMOTIDINE 20 MG/1
TABLET, FILM COATED ORAL
COMMUNITY
Start: 2022-02-21 | End: 2022-08-02 | Stop reason: SDUPTHER

## 2022-03-21 RX ORDER — BUDESONIDE AND FORMOTEROL FUMARATE DIHYDRATE 160; 4.5 UG/1; UG/1
AEROSOL RESPIRATORY (INHALATION)
COMMUNITY
Start: 2022-01-24 | End: 2022-03-21 | Stop reason: SDUPTHER

## 2022-03-21 RX ORDER — BUPRENORPHINE HYDROCHLORIDE AND NALOXONE HYDROCHLORIDE DIHYDRATE 8; 2 MG/1; MG/1
TABLET SUBLINGUAL
COMMUNITY
Start: 2022-01-25

## 2022-03-21 RX ORDER — ALBUTEROL SULFATE 1.25 MG/3ML
1 SOLUTION RESPIRATORY (INHALATION) EVERY 6 HOURS PRN
Qty: 360 ML | Refills: 5 | Status: SHIPPED | OUTPATIENT
Start: 2022-03-21 | End: 2022-08-02 | Stop reason: SDUPTHER

## 2022-03-21 RX ORDER — ALBUTEROL SULFATE 90 UG/1
2 AEROSOL, METERED RESPIRATORY (INHALATION) EVERY 6 HOURS PRN
Qty: 18 G | Refills: 1 | Status: SHIPPED | OUTPATIENT
Start: 2022-03-21 | End: 2022-08-02 | Stop reason: SDUPTHER

## 2022-03-21 RX ORDER — DOXYCYCLINE HYCLATE 100 MG/1
100 CAPSULE ORAL 2 TIMES DAILY
Qty: 14 CAPSULE | Refills: 0 | Status: SHIPPED | OUTPATIENT
Start: 2022-03-21 | End: 2022-03-28

## 2022-03-21 RX ORDER — LEVOCETIRIZINE DIHYDROCHLORIDE 5 MG/1
TABLET, FILM COATED ORAL
COMMUNITY
Start: 2022-01-24 | End: 2022-05-24 | Stop reason: HOSPADM

## 2022-03-21 NOTE — PROGRESS NOTES
Chief Complaint  URI and Sore Throat (Congestion and sore throat x three days. )    Subjective          Magi Neumann presents to [facility]  URI   This is a new problem. Episode onset: 3 days. The problem has been gradually worsening. There has been no fever. Associated symptoms include congestion, coughing, sneezing and a sore throat. Pertinent negatives include no abdominal pain, chest pain, diarrhea, dysuria, ear pain, headaches, joint pain, joint swelling, nausea, neck pain, plugged ear sensation, rash, rhinorrhea, sinus pain, swollen glands, vomiting or wheezing. She has tried nothing for the symptoms.   Sore Throat   Associated symptoms include congestion and coughing. Pertinent negatives include no abdominal pain, diarrhea, ear pain, headaches, plugged ear sensation, neck pain, swollen glands or vomiting.       Objective   No Known Allergies    There is no immunization history on file for this patient.  Past Medical History:   Diagnosis Date   • Chronic pancreatitis (HCC)    • COPD (chronic obstructive pulmonary disease) (HCC)       Past Surgical History:   Procedure Laterality Date   •  SECTION      x2   • CHOLECYSTECTOMY     • D & C WITH SUCTION      x2   • PANCREAS SURGERY     • TUBAL ABDOMINAL LIGATION        Social History     Socioeconomic History   • Marital status:    Tobacco Use   • Smoking status: Current Every Day Smoker   • Smokeless tobacco: Never Used   Vaping Use   • Vaping Use: Unknown   Substance and Sexual Activity   • Alcohol use: Never   • Drug use: Never   • Sexual activity: Never     Birth control/protection: None        Current Outpatient Medications:   •  buprenorphine-naloxone (SUBOXONE) 8-2 MG per SL tablet, , Disp: , Rfl:   •  estradiol (ESTRACE) 0.1 MG/GM vaginal cream, Insert 1 g into the vagina 2 (Two) Times a Week. Insert 1 gram into the vaginal every night for 2 weeks then twice weekly, Disp: 42 g, Rfl: 5  •  famotidine (PEPCID)  20 MG tablet, TAKE 1 TABLET BY MOUTH 2 HOURS BEFORE CLUSTER THERAPY, Disp: , Rfl:   •  fluticasone (FLONASE) 50 MCG/ACT nasal spray, 2 sprays by Each Nare route Daily., Disp: , Rfl:   •  gabapentin (NEURONTIN) 300 MG capsule, Take 300 mg by mouth 3 (Three) Times a Day., Disp: , Rfl:   •  levocetirizine (XYZAL) 5 MG tablet, TAKE ONE TABLET BY MOUTH DAILY FOR ITCHING, SNEEZING, AND/OR DRAINAGE., Disp: , Rfl:   •  potassium chloride (KLOR-CON) 8 MEQ CR tablet, Take 1 tablet by mouth Daily., Disp: 30 tablet, Rfl: 0  •  albuterol (ACCUNEB) 1.25 MG/3ML nebulizer solution, Take 3 mL by nebulization Every 6 (Six) Hours As Needed for Wheezing., Disp: 360 mL, Rfl: 5  •  albuterol sulfate  (90 Base) MCG/ACT inhaler, Inhale 2 puffs Every 6 (Six) Hours As Needed for Wheezing., Disp: 18 g, Rfl: 1  •  azithromycin (Zithromax Z-Serge) 250 MG tablet, Take 2 tablets by mouth on day 1, then 1 tablet daily on days 2-5, Disp: 6 tablet, Rfl: 0  •  doxycycline (VIBRAMYCIN) 100 MG capsule, Take 1 capsule by mouth 2 (Two) Times a Day for 7 days., Disp: 14 capsule, Rfl: 0  •  Symbicort 160-4.5 MCG/ACT inhaler, Inhale 2 puffs 2 (Two) Times a Day., Disp: 10.2 g, Rfl: 5   History reviewed. No pertinent family history.       Vital Signs:   Vitals:    03/21/22 1342   BP: 110/78   Pulse: 106   Temp: 98 °F (36.7 °C)   SpO2: 93%   Weight: 64 kg (141 lb)       Review of Systems   HENT: Positive for congestion, sneezing and sore throat. Negative for ear pain, rhinorrhea and sinus pain.    Respiratory: Positive for cough. Negative for wheezing.    Cardiovascular: Negative for chest pain.   Gastrointestinal: Negative for abdominal pain, diarrhea, nausea and vomiting.   Genitourinary: Negative for dysuria.   Musculoskeletal: Negative for joint pain and neck pain.   Skin: Negative for rash.   Neurological: Negative for headaches.      Physical Exam  Vitals reviewed.   Constitutional:       Appearance: Normal appearance. She is well-developed.   HENT:       Head: Normocephalic and atraumatic.      Right Ear: External ear normal.      Left Ear: External ear normal.      Mouth/Throat:      Pharynx: No oropharyngeal exudate.   Eyes:      Conjunctiva/sclera: Conjunctivae normal.      Pupils: Pupils are equal, round, and reactive to light.   Cardiovascular:      Rate and Rhythm: Normal rate and regular rhythm.      Pulses: Normal pulses.      Heart sounds: Normal heart sounds. No murmur heard.    No friction rub. No gallop.   Pulmonary:      Effort: Pulmonary effort is normal.      Breath sounds: Normal breath sounds. No wheezing or rhonchi.   Abdominal:      General: Bowel sounds are normal. There is no distension.      Palpations: Abdomen is soft.      Tenderness: There is no abdominal tenderness.   Skin:     General: Skin is warm and dry.   Neurological:      Mental Status: She is alert and oriented to person, place, and time.      Cranial Nerves: No cranial nerve deficit.   Psychiatric:         Mood and Affect: Mood and affect normal.         Behavior: Behavior normal.         Thought Content: Thought content normal.         Judgment: Judgment normal.        Result Review :   The following data was reviewed by: Shivam Arteaga MD on 03/21/2022:    Data reviewed: Radiologic studies I viewed and interpreted 2 views chest x-rays:NAD.          Assessment and Plan    Diagnoses and all orders for this visit:    1. Cough (Primary)  -     POCT rapid strep A  -     POCT SARS-CoV-2 Antigen NIKOLAS + Flu  -     XR Chest PA & Lateral (In Office)  -     Symbicort 160-4.5 MCG/ACT inhaler; Inhale 2 puffs 2 (Two) Times a Day.  Dispense: 10.2 g; Refill: 5  -     albuterol sulfate  (90 Base) MCG/ACT inhaler; Inhale 2 puffs Every 6 (Six) Hours As Needed for Wheezing.  Dispense: 18 g; Refill: 1  -     albuterol (ACCUNEB) 1.25 MG/3ML nebulizer solution; Take 3 mL by nebulization Every 6 (Six) Hours As Needed for Wheezing.  Dispense: 360 mL; Refill: 5    2. Bronchitis  -      azithromycin (Zithromax Z-Serge) 250 MG tablet; Take 2 tablets by mouth on day 1, then 1 tablet daily on days 2-5  Dispense: 6 tablet; Refill: 0  -     doxycycline (VIBRAMYCIN) 100 MG capsule; Take 1 capsule by mouth 2 (Two) Times a Day for 7 days.  Dispense: 14 capsule; Refill: 0            Follow Up   Return in about 1 week (around 3/28/2022), or if symptoms worsen or fail to improve.  Patient was given instructions and counseling regarding her condition or for health maintenance advice. Please see specific information pulled into the AVS if appropriate.

## 2022-03-29 ENCOUNTER — TELEPHONE (OUTPATIENT)
Dept: GASTROENTEROLOGY | Facility: CLINIC | Age: 47
End: 2022-03-29

## 2022-04-07 ENCOUNTER — TELEPHONE (OUTPATIENT)
Dept: FAMILY MEDICINE CLINIC | Facility: CLINIC | Age: 47
End: 2022-04-07

## 2022-04-07 DIAGNOSIS — R05.9 COUGH: Primary | ICD-10-CM

## 2022-04-07 RX ORDER — GUAIFENESIN 600 MG/1
1200 TABLET, EXTENDED RELEASE ORAL 2 TIMES DAILY
Qty: 20 TABLET | Refills: 0 | OUTPATIENT
Start: 2022-04-07 | End: 2022-04-12

## 2022-04-07 NOTE — TELEPHONE ENCOUNTER
Caller: Short, Magi    Relationship: Self    Best call back number: [phone]    What medication are you requesting: MUCINEX    What are your current symptoms: CONGESTION THAT WILL NOT BREAK UP    How long have you been experiencing symptoms: FEW DAYS    Have you had these symptoms before:    [x] Yes  [] No    Have you been treated for these symptoms before:   [x] Yes  [] No    If a prescription is needed, what is your preferred pharmacy and phone number:     [facility] [address] [place] [address] - [address] [facility] [phone] [facility] [phone]   [phone]     Additional notes: PLEASE CALL PATIENT ONCE SENT IN

## 2022-04-12 PROCEDURE — 87529 HSV DNA AMP PROBE: CPT | Performed by: NURSE PRACTITIONER

## 2022-04-12 PROCEDURE — 87081 CULTURE SCREEN ONLY: CPT | Performed by: NURSE PRACTITIONER

## 2022-04-14 ENCOUNTER — TELEPHONE (OUTPATIENT)
Dept: URGENT CARE | Facility: CLINIC | Age: 47
End: 2022-04-14

## 2022-04-14 NOTE — TELEPHONE ENCOUNTER
Discussed results with patient. Advised to continue Acyclovir as prescribed and follow up with [facility] for further management.

## 2022-04-15 ENCOUNTER — TELEPHONE (OUTPATIENT)
Dept: URGENT CARE | Facility: CLINIC | Age: 47
End: 2022-04-15

## 2022-04-16 ENCOUNTER — TELEPHONE (OUTPATIENT)
Dept: URGENT CARE | Facility: CLINIC | Age: 47
End: 2022-04-16

## 2022-04-16 NOTE — TELEPHONE ENCOUNTER
----- Message from YULI Macias sent at 4/15/2022  9:28 AM EDT -----  Please call the patient regarding her negative result.

## 2022-05-18 ENCOUNTER — TELEPHONE (OUTPATIENT)
Dept: FAMILY MEDICINE CLINIC | Facility: CLINIC | Age: 47
End: 2022-05-18

## 2022-05-18 DIAGNOSIS — R05.9 COUGH: ICD-10-CM

## 2022-05-18 DIAGNOSIS — E87.6 HYPOKALEMIA: ICD-10-CM

## 2022-05-18 RX ORDER — POTASSIUM CHLORIDE 600 MG/1
8 TABLET, FILM COATED, EXTENDED RELEASE ORAL DAILY
Qty: 30 TABLET | Refills: 0 | Status: CANCELLED | OUTPATIENT
Start: 2022-05-18

## 2022-05-18 RX ORDER — POTASSIUM CHLORIDE 600 MG/1
8 TABLET, FILM COATED, EXTENDED RELEASE ORAL DAILY
Qty: 30 TABLET | Refills: 0 | Status: SHIPPED | OUTPATIENT
Start: 2022-05-18 | End: 2022-06-16 | Stop reason: SDUPTHER

## 2022-05-19 ENCOUNTER — OFFICE VISIT (OUTPATIENT)
Dept: FAMILY MEDICINE CLINIC | Facility: CLINIC | Age: 47
End: 2022-05-19

## 2022-05-19 VITALS
TEMPERATURE: 98 F | DIASTOLIC BLOOD PRESSURE: 80 MMHG | WEIGHT: 131 LBS | SYSTOLIC BLOOD PRESSURE: 131 MMHG | BODY MASS INDEX: 21.14 KG/M2

## 2022-05-19 DIAGNOSIS — R20.0 NUMBNESS: ICD-10-CM

## 2022-05-19 DIAGNOSIS — R07.9 CHEST PAIN, UNSPECIFIED TYPE: ICD-10-CM

## 2022-05-19 DIAGNOSIS — G62.9 NEUROPATHY: ICD-10-CM

## 2022-05-19 DIAGNOSIS — D72.829 LEUKOCYTOSIS, UNSPECIFIED TYPE: ICD-10-CM

## 2022-05-19 DIAGNOSIS — R55 SYNCOPE, UNSPECIFIED SYNCOPE TYPE: ICD-10-CM

## 2022-05-19 DIAGNOSIS — H66.92 LEFT OTITIS MEDIA, UNSPECIFIED OTITIS MEDIA TYPE: ICD-10-CM

## 2022-05-19 DIAGNOSIS — R42 VERTIGO: ICD-10-CM

## 2022-05-19 DIAGNOSIS — H91.92 DECREASED HEARING OF LEFT EAR: ICD-10-CM

## 2022-05-19 DIAGNOSIS — R42 DIZZINESS: Primary | ICD-10-CM

## 2022-05-19 DIAGNOSIS — E87.6 HYPOKALEMIA: ICD-10-CM

## 2022-05-19 PROBLEM — F41.9 ANXIETY: Status: ACTIVE | Noted: 2022-05-19

## 2022-05-19 PROBLEM — K85.90 PANCREATITIS: Status: ACTIVE | Noted: 2022-05-19

## 2022-05-19 PROCEDURE — 80050 GENERAL HEALTH PANEL: CPT | Performed by: FAMILY MEDICINE

## 2022-05-19 PROCEDURE — 87086 URINE CULTURE/COLONY COUNT: CPT | Performed by: FAMILY MEDICINE

## 2022-05-19 PROCEDURE — 80307 DRUG TEST PRSMV CHEM ANLYZR: CPT | Performed by: FAMILY MEDICINE

## 2022-05-19 PROCEDURE — 84484 ASSAY OF TROPONIN QUANT: CPT | Performed by: FAMILY MEDICINE

## 2022-05-19 PROCEDURE — 82607 VITAMIN B-12: CPT | Performed by: FAMILY MEDICINE

## 2022-05-19 PROCEDURE — 81003 URINALYSIS AUTO W/O SCOPE: CPT | Performed by: FAMILY MEDICINE

## 2022-05-19 PROCEDURE — 99214 OFFICE O/P EST MOD 30 MIN: CPT | Performed by: FAMILY MEDICINE

## 2022-05-19 PROCEDURE — 93000 ELECTROCARDIOGRAM COMPLETE: CPT | Performed by: FAMILY MEDICINE

## 2022-05-19 PROCEDURE — 83735 ASSAY OF MAGNESIUM: CPT | Performed by: FAMILY MEDICINE

## 2022-05-19 PROCEDURE — 82746 ASSAY OF FOLIC ACID SERUM: CPT | Performed by: FAMILY MEDICINE

## 2022-05-19 PROCEDURE — 84439 ASSAY OF FREE THYROXINE: CPT | Performed by: FAMILY MEDICINE

## 2022-05-19 RX ORDER — GABAPENTIN 100 MG/1
CAPSULE ORAL
COMMUNITY
Start: 2022-05-15

## 2022-05-19 RX ORDER — SACCHAROMYCES BOULARDII 250 MG
250 CAPSULE ORAL 2 TIMES DAILY
Qty: 20 CAPSULE | Refills: 0 | Status: SHIPPED | OUTPATIENT
Start: 2022-05-19 | End: 2022-05-29

## 2022-05-19 RX ORDER — CEFDINIR 300 MG/1
300 CAPSULE ORAL 2 TIMES DAILY
Qty: 14 CAPSULE | Refills: 0 | Status: SHIPPED | OUTPATIENT
Start: 2022-05-19 | End: 2022-05-20 | Stop reason: SDUPTHER

## 2022-05-19 RX ORDER — MECLIZINE HYDROCHLORIDE 25 MG/1
25 TABLET ORAL 3 TIMES DAILY PRN
Qty: 21 TABLET | Refills: 0 | Status: SHIPPED | OUTPATIENT
Start: 2022-05-19 | End: 2022-05-20 | Stop reason: SDUPTHER

## 2022-05-19 NOTE — PROGRESS NOTES
Venipuncture Blood Specimen Collection  Venipuncture performed in left arm  by Maribel Dumas with good hemostasis. Patient tolerated the procedure well without complications.   05/19/22   Maribel Dumas

## 2022-05-19 NOTE — PROGRESS NOTES
Chief Complaint  Chest Pain (Follow up from [facility] )    Subjective          Magi Short presents to [facility]  Pt was taking bath 7 days ago and was found unconscious on bathroom floor by sister- pt did not go to ER until - when pt went to ER she had neg troponin and hypokalemia and leukocytosis- pt has had intermittent dizziness, had chest pain on night of ER visit with left arm pain- pt has been having chest pain daily that do not change- pt diagnosed with peripheral neuropathy in both feet- pt has had left ear pain x 1 week with decreased hearing in left ear    Reviewed ER records      Objective   No Known Allergies    There is no immunization history on file for this patient.  Past Medical History:   Diagnosis Date   • Chronic pancreatitis (HCC)    • COPD (chronic obstructive pulmonary disease) (HCC)       Past Surgical History:   Procedure Laterality Date   •  SECTION      x2   • CHOLECYSTECTOMY     • D & C WITH SUCTION      x2   • PANCREAS SURGERY     • TUBAL ABDOMINAL LIGATION        Social History     Socioeconomic History   • Marital status:    Tobacco Use   • Smoking status: Current Every Day Smoker   • Smokeless tobacco: Never Used   Vaping Use   • Vaping Use: Never used   Substance and Sexual Activity   • Alcohol use: Never   • Drug use: Never   • Sexual activity: Never     Birth control/protection: None        Current Outpatient Medications:   •  albuterol (ACCUNEB) 1.25 MG/3ML nebulizer solution, Take 3 mL by nebulization Every 6 (Six) Hours As Needed for Wheezing., Disp: 360 mL, Rfl: 5  •  albuterol sulfate  (90 Base) MCG/ACT inhaler, Inhale 2 puffs Every 6 (Six) Hours As Needed for Wheezing., Disp: 18 g, Rfl: 1  •  buprenorphine-naloxone (SUBOXONE) 8-2 MG per SL tablet, , Disp: , Rfl:   •  escitalopram (LEXAPRO) 10 MG tablet, , Disp: , Rfl:   •  estradiol (ESTRACE) 0.1 MG/GM vaginal cream, Insert 1 g into the vagina 2  (Two) Times a Week. Insert 1 gram into the vaginal every night for 2 weeks then twice weekly, Disp: 42 g, Rfl: 5  •  famotidine (PEPCID) 20 MG tablet, TAKE 1 TABLET BY MOUTH 2 HOURS BEFORE CLUSTER THERAPY, Disp: , Rfl:   •  fluticasone (FLONASE) 50 MCG/ACT nasal spray, 2 sprays by Each Nare route Daily., Disp: , Rfl:   •  gabapentin (NEURONTIN) 100 MG capsule, , Disp: , Rfl:   •  gabapentin (NEURONTIN) 300 MG capsule, Take 300 mg by mouth 3 (Three) Times a Day., Disp: , Rfl:   •  levocetirizine (XYZAL) 5 MG tablet, TAKE ONE TABLET BY MOUTH DAILY FOR ITCHING, SNEEZING, AND/OR DRAINAGE., Disp: , Rfl:   •  magic mouthwash oral suspension, Swish and spit 10 mL Every 6 (Six) Hours As Needed for Stomatitis., Disp: 360 mL, Rfl: 0  •  potassium chloride (KLOR-CON) 8 MEQ CR tablet, Take 1 tablet by mouth Daily., Disp: 30 tablet, Rfl: 0  •  Symbicort 160-4.5 MCG/ACT inhaler, Inhale 2 puffs 2 (Two) Times a Day., Disp: 10.2 g, Rfl: 5  •  cefdinir (OMNICEF) 300 MG capsule, Take 1 capsule by mouth 2 (Two) Times a Day for 7 days., Disp: 14 capsule, Rfl: 0  •  meclizine (ANTIVERT) 25 MG tablet, Take 1 tablet by mouth 3 (Three) Times a Day As Needed for Dizziness., Disp: 21 tablet, Rfl: 0  •  saccharomyces boulardii (Florastor) 250 MG capsule, Take 1 capsule by mouth 2 (Two) Times a Day for 10 days., Disp: 20 capsule, Rfl: 0   History reviewed. No pertinent family history.       Vital Signs:   Vitals:    05/19/22 1648   BP: 131/80   Temp: 98 °F (36.7 °C)   Weight: 59.4 kg (131 lb)       Review of Systems   Constitutional: Positive for fatigue. Negative for fever.   HENT: Positive for ear pain and hearing loss. Negative for sore throat.    Eyes: Negative for visual disturbance.   Respiratory: Negative for chest tightness, shortness of breath and wheezing.    Cardiovascular: Positive for chest pain. Negative for palpitations and leg swelling.   Gastrointestinal: Negative for abdominal pain, diarrhea, nausea and vomiting.   Skin:  Negative for rash.   Neurological: Positive for dizziness, syncope, light-headedness, numbness and headaches. Negative for seizures and speech difficulty.   Psychiatric/Behavioral: Negative for behavioral problems and dysphoric mood. The patient is not nervous/anxious.       Physical Exam  Vitals reviewed.   Constitutional:       Appearance: Normal appearance. She is well-developed.   HENT:      Head: Normocephalic and atraumatic.      Right Ear: Tympanic membrane, ear canal and external ear normal.      Left Ear: Ear canal and external ear normal.      Ears:      Comments: Left TM cloudy, red     Nose: Nose normal.      Mouth/Throat:      Mouth: Mucous membranes are moist.      Pharynx: Oropharynx is clear. No oropharyngeal exudate or posterior oropharyngeal erythema.   Eyes:      Extraocular Movements: Extraocular movements intact.      Conjunctiva/sclera: Conjunctivae normal.      Pupils: Pupils are equal, round, and reactive to light.   Cardiovascular:      Rate and Rhythm: Normal rate and regular rhythm.      Pulses: Normal pulses.      Heart sounds: Normal heart sounds. No murmur heard.    No friction rub. No gallop.   Pulmonary:      Effort: Pulmonary effort is normal.      Breath sounds: Normal breath sounds. No wheezing or rhonchi.   Abdominal:      General: Bowel sounds are normal. There is no distension.      Palpations: Abdomen is soft.      Tenderness: There is no abdominal tenderness.   Musculoskeletal:         General: Normal range of motion.      Cervical back: Normal range of motion and neck supple.   Skin:     General: Skin is warm and dry.      Capillary Refill: Capillary refill takes less than 2 seconds.   Neurological:      General: No focal deficit present.      Mental Status: She is alert and oriented to person, place, and time.      Cranial Nerves: No cranial nerve deficit.   Psychiatric:         Mood and Affect: Mood and affect normal.         Behavior: Behavior normal.         Thought  Content: Thought content normal.         Judgment: Judgment normal.        Result Review :            ECG 12 Lead    Date/Time: 5/19/2022 5:58 PM  Performed by: Shivam Arteaga MD  Authorized by: Shivam Arteaga MD   Comparison: compared with previous ECG from 5/15/2022  Similar to previous ECG  Rhythm: sinus rhythm  Rate: normal  Conduction: conduction normal  ST Segments: ST segments normal  T Waves: T waves normal  QRS axis: normal  Other: no other findings  Lead: right-sided leads used    Clinical impression: normal ECG              Assessment and Plan    Diagnoses and all orders for this visit:    1. Dizziness (Primary)  -     Magnesium  -     Vitamin B12 & Folate  -     TSH+Free T4  -     Ambulatory Referral to Neurology  -     Duplex Carotid Ultrasound CAR; Future  -     MRI Brain With & Without Contrast; Future  -     Urine Drug Screen - Urine, Clean Catch  -     Urine Culture - Urine, Urine, Clean Catch  -     Urinalysis With Microscopic If Indicated (No Culture) - Urine, Clean Catch  -     Ambulatory Referral to ENT (Otolaryngology)    2. Leukocytosis, unspecified type  -     CBC Auto Differential    3. Hypokalemia  -     Comprehensive Metabolic Panel    4. Chest pain, unspecified type  -     TSH+Free T4  -     Ambulatory Referral to Cardiology  -     Troponin T ([facility])  -     ECG 12 Lead    5. Numbness  -     Vitamin B12 & Folate  -     Ambulatory Referral to Neurology  -     MRI Brain With & Without Contrast; Future    6. Syncope, unspecified syncope type  -     Magnesium  -     TSH+Free T4  -     Ambulatory Referral to Neurology  -     MRI Brain With & Without Contrast; Future  -     Urine Drug Screen - Urine, Clean Catch  -     Urine Culture - Urine, Urine, Clean Catch  -     Urinalysis With Microscopic If Indicated (No Culture) - Urine, Clean Catch    7. Neuropathy  -     Ambulatory Referral to Neurology  -     Urine Drug Screen - Urine, Clean Catch    8. Vertigo  -     meclizine  (ANTIVERT) 25 MG tablet; Take 1 tablet by mouth 3 (Three) Times a Day As Needed for Dizziness.  Dispense: 21 tablet; Refill: 0  -     Ambulatory Referral to ENT (Otolaryngology)    9. Left otitis media, unspecified otitis media type  -     cefdinir (OMNICEF) 300 MG capsule; Take 1 capsule by mouth 2 (Two) Times a Day for 7 days.  Dispense: 14 capsule; Refill: 0  -     saccharomyces boulardii (Florastor) 250 MG capsule; Take 1 capsule by mouth 2 (Two) Times a Day for 10 days.  Dispense: 20 capsule; Refill: 0  -     Ambulatory Referral to ENT (Otolaryngology)    10. Decreased hearing of left ear  -     Ambulatory Referral to ENT (Otolaryngology)            Follow Up   Return in about 2 weeks (around 6/2/2022) for Recheck.  Patient was given instructions and counseling regarding her condition or for health maintenance advice. Please see specific information pulled into the AVS if appropriate.   Pt told to go to ER if symptoms return, change , or worsen

## 2022-05-20 ENCOUNTER — TELEPHONE (OUTPATIENT)
Dept: FAMILY MEDICINE CLINIC | Facility: CLINIC | Age: 47
End: 2022-05-20

## 2022-05-20 DIAGNOSIS — H66.92 LEFT OTITIS MEDIA, UNSPECIFIED OTITIS MEDIA TYPE: ICD-10-CM

## 2022-05-20 DIAGNOSIS — R42 VERTIGO: ICD-10-CM

## 2022-05-20 DIAGNOSIS — K13.70 UNSPECIFIED LESIONS OF ORAL MUCOSA: ICD-10-CM

## 2022-05-20 LAB
ALBUMIN SERPL-MCNC: 3.8 G/DL (ref 3.5–5.2)
ALBUMIN/GLOB SERPL: 1 G/DL
ALP SERPL-CCNC: 113 U/L (ref 39–117)
ALT SERPL W P-5'-P-CCNC: 14 U/L (ref 1–33)
AMPHET+METHAMPHET UR QL: NEGATIVE
ANION GAP SERPL CALCULATED.3IONS-SCNC: 16.9 MMOL/L (ref 5–15)
AST SERPL-CCNC: 26 U/L (ref 1–32)
BARBITURATES UR QL SCN: NEGATIVE
BASOPHILS # BLD AUTO: 0.11 10*3/MM3 (ref 0–0.2)
BASOPHILS NFR BLD AUTO: 1.2 % (ref 0–1.5)
BENZODIAZ UR QL SCN: POSITIVE
BILIRUB SERPL-MCNC: 0.3 MG/DL (ref 0–1.2)
BILIRUB UR QL STRIP: NEGATIVE
BUN SERPL-MCNC: 11 MG/DL (ref 6–20)
BUN/CREAT SERPL: 10.7 (ref 7–25)
CALCIUM SPEC-SCNC: 10 MG/DL (ref 8.6–10.5)
CANNABINOIDS SERPL QL: NEGATIVE
CHLORIDE SERPL-SCNC: 93 MMOL/L (ref 98–107)
CLARITY UR: CLEAR
CO2 SERPL-SCNC: 27.1 MMOL/L (ref 22–29)
COCAINE UR QL: NEGATIVE
COLOR UR: YELLOW
CREAT SERPL-MCNC: 1.03 MG/DL (ref 0.57–1)
DEPRECATED RDW RBC AUTO: 46.1 FL (ref 37–54)
EGFRCR SERPLBLD CKD-EPI 2021: 68.9 ML/MIN/1.73
EOSINOPHIL # BLD AUTO: 0.11 10*3/MM3 (ref 0–0.4)
EOSINOPHIL NFR BLD AUTO: 1.2 % (ref 0.3–6.2)
ERYTHROCYTE [DISTWIDTH] IN BLOOD BY AUTOMATED COUNT: 14.6 % (ref 12.3–15.4)
FOLATE SERPL-MCNC: 8.02 NG/ML (ref 4.78–24.2)
GLOBULIN UR ELPH-MCNC: 3.7 GM/DL
GLUCOSE SERPL-MCNC: 108 MG/DL (ref 65–99)
GLUCOSE UR STRIP-MCNC: NEGATIVE MG/DL
HCT VFR BLD AUTO: 46.5 % (ref 34–46.6)
HGB BLD-MCNC: 15.4 G/DL (ref 12–15.9)
HGB UR QL STRIP.AUTO: NEGATIVE
IMM GRANULOCYTES # BLD AUTO: 0.02 10*3/MM3 (ref 0–0.05)
IMM GRANULOCYTES NFR BLD AUTO: 0.2 % (ref 0–0.5)
KETONES UR QL STRIP: NEGATIVE
LEUKOCYTE ESTERASE UR QL STRIP.AUTO: NEGATIVE
LYMPHOCYTES # BLD AUTO: 2.95 10*3/MM3 (ref 0.7–3.1)
LYMPHOCYTES NFR BLD AUTO: 33.2 % (ref 19.6–45.3)
MAGNESIUM SERPL-MCNC: 2 MG/DL (ref 1.6–2.6)
MCH RBC QN AUTO: 29.2 PG (ref 26.6–33)
MCHC RBC AUTO-ENTMCNC: 33.1 G/DL (ref 31.5–35.7)
MCV RBC AUTO: 88.1 FL (ref 79–97)
METHADONE UR QL SCN: NEGATIVE
MONOCYTES # BLD AUTO: 0.6 10*3/MM3 (ref 0.1–0.9)
MONOCYTES NFR BLD AUTO: 6.8 % (ref 5–12)
NEUTROPHILS NFR BLD AUTO: 5.09 10*3/MM3 (ref 1.7–7)
NEUTROPHILS NFR BLD AUTO: 57.4 % (ref 42.7–76)
NITRITE UR QL STRIP: NEGATIVE
NRBC BLD AUTO-RTO: 0 /100 WBC (ref 0–0.2)
OPIATES UR QL: NEGATIVE
OXYCODONE UR QL SCN: POSITIVE
PH UR STRIP.AUTO: 7 [PH] (ref 5–8)
PLATELET # BLD AUTO: 380 10*3/MM3 (ref 140–450)
PMV BLD AUTO: 11.4 FL (ref 6–12)
POTASSIUM SERPL-SCNC: 3.2 MMOL/L (ref 3.5–5.2)
PROT SERPL-MCNC: 7.5 G/DL (ref 6–8.5)
PROT UR QL STRIP: NEGATIVE
RBC # BLD AUTO: 5.28 10*6/MM3 (ref 3.77–5.28)
SODIUM SERPL-SCNC: 137 MMOL/L (ref 136–145)
SP GR UR STRIP: <=1.005 (ref 1–1.03)
T4 FREE SERPL-MCNC: 1.08 NG/DL (ref 0.93–1.7)
TSH SERPL DL<=0.05 MIU/L-ACNC: 2.24 UIU/ML (ref 0.27–4.2)
UROBILINOGEN UR QL STRIP: NORMAL
VIT B12 BLD-MCNC: 212 PG/ML (ref 211–946)
WBC NRBC COR # BLD: 8.88 10*3/MM3 (ref 3.4–10.8)

## 2022-05-20 RX ORDER — MECLIZINE HYDROCHLORIDE 25 MG/1
25 TABLET ORAL 3 TIMES DAILY PRN
Qty: 21 TABLET | Refills: 0 | Status: SHIPPED | OUTPATIENT
Start: 2022-05-20

## 2022-05-20 RX ORDER — CEFDINIR 300 MG/1
300 CAPSULE ORAL 2 TIMES DAILY
Qty: 14 CAPSULE | Refills: 0 | Status: SHIPPED | OUTPATIENT
Start: 2022-05-20 | End: 2022-05-27

## 2022-05-21 LAB — BACTERIA SPEC AEROBE CULT: NORMAL

## 2022-05-23 NOTE — PROGRESS NOTES
Call pt: She has some abnormal labs that need to be discussed.  Follow-up in 1-2 weeks to discuss and treat.

## 2022-05-24 ENCOUNTER — OFFICE VISIT (OUTPATIENT)
Dept: CARDIOLOGY | Facility: CLINIC | Age: 47
End: 2022-05-24

## 2022-05-24 VITALS
SYSTOLIC BLOOD PRESSURE: 112 MMHG | HEART RATE: 90 BPM | DIASTOLIC BLOOD PRESSURE: 72 MMHG | WEIGHT: 133 LBS | HEIGHT: 66 IN | BODY MASS INDEX: 21.38 KG/M2

## 2022-05-24 DIAGNOSIS — Z72.0 NICOTINE USE: ICD-10-CM

## 2022-05-24 DIAGNOSIS — R07.9 CHEST PAIN, UNSPECIFIED TYPE: Primary | ICD-10-CM

## 2022-05-24 PROCEDURE — 99202 OFFICE O/P NEW SF 15 MIN: CPT | Performed by: SPECIALIST

## 2022-05-24 NOTE — PATIENT INSTRUCTIONS
Managing the Challenge of Quitting Smoking  Quitting smoking is a physical and mental challenge. You will face cravings, withdrawal symptoms, and temptation. Before quitting, work with your health care provider to make a plan that can help you manage quitting. Preparation can help you quit and keep you from giving in.  How to manage lifestyle changes  Managing stress  Stress can make you want to smoke, and wanting to smoke may cause stress. It is important to find ways to manage your stress. You might try some of the following:  Practice relaxation techniques.  Breathe slowly and deeply, in through your nose and out through your mouth.  Listen to music.  Soak in a bath or take a shower.  Imagine a peaceful place or vacation.  Get some support.  Talk with family or friends about your stress.  Join a support group.  Talk with a counselor or therapist.  Get some physical activity.  Go for a walk, run, or bike ride.  Play a favorite sport.  Practice yoga.    Medicines  Talk with your health care provider about medicines that might help you deal with cravings and make quitting easier for you.  Relationships  Social situations can be difficult when you are quitting smoking. To manage this, you can:  Avoid parties and other social situations where people might be smoking.  Avoid alcohol.  Leave right away if you have the urge to smoke.  Explain to your family and friends that you are quitting smoking. Ask for support and let them know you might be a bit grumpy.  Plan activities where smoking is not an option.  General instructions  Be aware that many people gain weight after they quit smoking. However, not everyone does. To keep from gaining weight, have a plan in place before you quit and stick to the plan after you quit. Your plan should include:  Having healthy snacks. When you have a craving, it may help to:  Eat popcorn, carrots, celery, or other cut vegetables.  Chew sugar-free gum.  Changing how you eat.  Eat small  portion sizes at meals.  Eat 4-6 small meals throughout the day instead of 1-2 large meals a day.  Be mindful when you eat. Do not watch television or do other things that might distract you as you eat.  Exercising regularly.  Make time to exercise each day. If you do not have time for a long workout, do short bouts of exercise for 5-10 minutes several times a day.  Do some form of strengthening exercise, such as weight lifting.  Do some exercise that gets your heart beating and causes you to breathe deeply, such as walking fast, running, swimming, or biking. This is very important.  Drinking plenty of water or other low-calorie or no-calorie drinks. Drink 6-8 glasses of water daily.    How to recognize withdrawal symptoms  Your body and mind may experience discomfort as you try to get used to not having nicotine in your system. These effects are called withdrawal symptoms. They may include:  Feeling hungrier than normal.  Having trouble concentrating.  Feeling irritable or restless.  Having trouble sleeping.  Feeling depressed.  Craving a cigarette.  To manage withdrawal symptoms:  Avoid places, people, and activities that trigger your cravings.  Remember why you want to quit.  Get plenty of sleep.  Avoid coffee and other caffeinated drinks. These may worsen some of your symptoms.  These symptoms may surprise you. But be assured that they are normal to have when quitting smoking.  How to manage cravings  Come up with a plan for how to deal with your cravings. The plan should include the following:  A definition of the specific situation you want to deal with.  An alternative action you will take.  A clear idea for how this action will help.  The name of someone who might help you with this.  Cravings usually last for 5-10 minutes. Consider taking the following actions to help you with your plan to deal with cravings:  Keep your mouth busy.  Chew sugar-free gum.  Suck on hard candies or a straw.  Brush your  teeth.  Keep your hands and body busy.  Change to a different activity right away.  Squeeze or play with a ball.  Do an activity or a hobby, such as making bead jewelry, practicing needlepoint, or working with wood.  Mix up your normal routine.  Take a short exercise break. Go for a quick walk or run up and down stairs.  Focus on doing something kind or helpful for someone else.  Call a friend or family member to talk during a craving.  Join a support group.  Contact a quitline.  Where to find support  To get help or find a support group:  Call the [employer] [place]'s Smoking Quitline: 9-514-QUIT NOW (616-9610)  Visit the website of the Substance Abuse and Mental Health Services Administration: www.samhsa.gov  Text QUIT to SmokefreeTXT: 894510  Where to find more information  Visit these websites to find more information on quitting smoking:  [employer] [place]: www.smokefree.gov  American Lung Association: www.lung.org  American Cancer Society: www.cancer.org  Centers for Disease Control and Prevention: www.cdc.gov  American Heart Association: www.heart.org  Contact a health care provider if:  You want to change your plan for quitting.  The medicines you are taking are not helping.  Your eating feels out of control or you cannot sleep.  Get help right away if:  You feel depressed or become very anxious.  Summary  Quitting smoking is a physical and mental challenge. You will face cravings, withdrawal symptoms, and temptation to smoke again. Preparation can help you as you go through these challenges.  Try different techniques to manage stress, handle social situations, and prevent weight gain.  You can deal with cravings by keeping your mouth busy (such as by chewing gum), keeping your hands and body busy, calling family or friends, or contacting a quitline for people who want to quit smoking.  You can deal with withdrawal symptoms by avoiding places where people smoke, getting plenty of rest, and  avoiding drinks with caffeine.  This information is not intended to replace advice given to you by your health care provider. Make sure you discuss any questions you have with your health care provider.  Document Revised: 10/06/2020 Document Reviewed: 10/06/2020  [employer]vier Patient Education © 2021 [employer]

## 2022-05-24 NOTE — PROGRESS NOTES
[facility]   Cardiology Consult Note    Patient Name: Magi Neumann  : 1975  Referring Physician: Shivam Arteaga MD  Subjective   Subjective     Reason for Consult/ Chief Complaint:   Chief Complaint   Patient presents with   • Chest Pain   • Left arm nubness   • Shortness of Breath       HPI:  Magi Neumann is a 46 y.o. female with history of chest pain on and off for the last couple of months.  Chest pain is substernal sharp to aching lasted a few seconds to few minutes not exertional relieved spontaneously.  No syncopal or presyncopal episode no exertional angina.  She is a heavy smoker.    Review of Systems:   Constitutional no fever,  no weight loss   Skin no rash   Otolaryngeal no difficulty swallowing   Cardiovascular See HPI   Pulmonary no cough, no sputum production   Gastrointestinal no constipation, no diarrhea   Genitourinary no dysuria, no hematuria   Hematologic no easy bruisability, no abnormal bleeding   Musculoskeletal no muscle pain   Neurologic no dizziness, no falls     Personal History     Past Medical History:  Past Medical History:   Diagnosis Date   • Chronic pancreatitis (HCC)    • COPD (chronic obstructive pulmonary disease) ([facility])        Family History: History reviewed. No pertinent family history.    Social History:  reports that she has been smoking. She has never used smokeless tobacco. She reports that she does not drink alcohol and does not use drugs.    Home Medications:  albuterol, albuterol sulfate HFA, budesonide-formoterol, buprenorphine-naloxone, cefdinir, estradiol, famotidine, fluticasone, gabapentin, meclizine, potassium chloride, and saccharomyces boulardii    Allergies:  No Known Allergies    Objective    Objective     Vitals:   Heart Rate:  [90] 90  BP: (112)/(72) 112/72  Body mass index is 21.47 kg/m².  Physical Exam:   Constitutional: Awake, alert, No acute distress    Eyes: PERRLA, sclerae anicteric, no conjunctival injection   HENT: NCAT, mucous  membranes moist   Neck: Supple, no thyromegaly, no lymphadenopathy, trachea midline   Respiratory: Clear to auscultation bilaterally, nonlabored respirations    Cardiovascular: RRR, no murmurs or rubs. Palpable pedal pulses bilaterally   Gastrointestinal: Positive bowel sounds, soft, nontender, nondistended   Musculoskeletal: No bilateral ankle edema, no clubbing or cyanosis to extremities   Psychiatric: Appropriate affect, cooperative   Neurologic: Oriented x 3, strength symmetric in all extremities, Cranial Nerves grossly intact to confrontation, speech clear   Skin: No rashes     Result Review    Result Review:  I have personally reviewed the available results:  [x]  Laboratory  [x]  EKG/Telemetry   [x]  Cardiology/Vascular   [x] Medications  [x]  Old records  No results found for: CHOL  Lab Results   Component Value Date    TRIG 239 (H) 02/09/2021     Lab Results   Component Value Date    HDL 40 02/09/2021     Lab Results   Component Value Date    LDL 82 02/09/2021     Lab Results   Component Value Date    VLDL 48 (H) 02/09/2021       Procedures     Impression/Plan  1.  Precordial atypical chest pain: Treadmill stress test to evaluate any significant ischemia echocardiogram to evaluate left ventricular systolic function.  Advised to decrease caffeine intake.  2.  Positive nicotine use: Smoking cessation was discussed with patient.        Electronically signed by Santo Headley MD, 05/24/22, 3:01 PM EDT.

## 2022-05-25 ENCOUNTER — TELEPHONE (OUTPATIENT)
Dept: FAMILY MEDICINE CLINIC | Facility: CLINIC | Age: 47
End: 2022-05-25

## 2022-05-25 NOTE — TELEPHONE ENCOUNTER
Pt called right ear is now hurting also. She not received any calls from ENT . Should she come in for ear?

## 2022-05-26 LAB — TROPONIN T SERPL HS-MCNC: NORMAL NG/L

## 2022-05-26 NOTE — TELEPHONE ENCOUNTER
Spoke with patient and gave her number to [facility] and if her apt is too far out on date she is calling back to schedule with Chandler Carlson for follow up on ear

## 2022-05-26 NOTE — TELEPHONE ENCOUNTER
Pt should come in to be seen by a provider.  Please get her an appt to see one of us providers, whoever has appts available.  Also, the ENT referral was placed 1 week ago and I checked and nothing has been even started on the referral.  I do not know how to get things going on this.  Thanks.

## 2022-06-15 DIAGNOSIS — E87.6 HYPOKALEMIA: ICD-10-CM

## 2022-06-15 RX ORDER — POTASSIUM CHLORIDE 600 MG/1
CAPSULE, EXTENDED RELEASE ORAL
Qty: 30 CAPSULE | OUTPATIENT
Start: 2022-06-15

## 2022-06-16 ENCOUNTER — PATIENT MESSAGE (OUTPATIENT)
Dept: NEUROLOGY | Facility: CLINIC | Age: 47
End: 2022-06-16

## 2022-06-16 DIAGNOSIS — E87.6 HYPOKALEMIA: ICD-10-CM

## 2022-06-16 RX ORDER — POTASSIUM CHLORIDE 600 MG/1
8 TABLET, FILM COATED, EXTENDED RELEASE ORAL DAILY
Qty: 30 TABLET | Refills: 0 | Status: SHIPPED | OUTPATIENT
Start: 2022-06-16 | End: 2022-08-02 | Stop reason: SDUPTHER

## 2022-06-17 ENCOUNTER — APPOINTMENT (OUTPATIENT)
Dept: MRI IMAGING | Facility: HOSPITAL | Age: 47
End: 2022-06-17

## 2022-06-17 ENCOUNTER — APPOINTMENT (OUTPATIENT)
Dept: CARDIOLOGY | Facility: HOSPITAL | Age: 47
End: 2022-06-17

## 2022-06-22 ENCOUNTER — TELEPHONE (OUTPATIENT)
Dept: FAMILY MEDICINE CLINIC | Facility: CLINIC | Age: 47
End: 2022-06-22

## 2022-06-22 NOTE — TELEPHONE ENCOUNTER
Patient was transferred to office by [facility]--patient states she has a blacking out feeling and doesn't feel well she is recommended to go to ER for evaluation. Refuses to go to ER. She only wanted to see her PCP no one else.  Advised her that Dr. Arteaga is not available--She agreed to schedule with another provider on Friday due to she is busy tomorrow with several appointments. Once again before hanging up urged her to seek treatment at ER.

## 2022-06-23 ENCOUNTER — APPOINTMENT (OUTPATIENT)
Dept: CARDIOLOGY | Facility: HOSPITAL | Age: 47
End: 2022-06-23

## 2022-06-23 ENCOUNTER — HOSPITAL ENCOUNTER (OUTPATIENT)
Dept: MRI IMAGING | Facility: HOSPITAL | Age: 47
Discharge: HOME | End: 2022-06-23

## 2022-06-23 ENCOUNTER — HOSPITAL ENCOUNTER (OUTPATIENT)
Dept: CARDIOLOGY | Facility: HOSPITAL | Age: 47
Discharge: HOME | End: 2022-06-23

## 2022-06-23 DIAGNOSIS — R42 DIZZINESS: ICD-10-CM

## 2022-06-23 DIAGNOSIS — R55 SYNCOPE, UNSPECIFIED SYNCOPE TYPE: ICD-10-CM

## 2022-06-23 DIAGNOSIS — R20.0 NUMBNESS: ICD-10-CM

## 2022-06-23 DIAGNOSIS — H83.02 INFECTION OF LEFT INNER EAR: Primary | ICD-10-CM

## 2022-06-23 DIAGNOSIS — H70.92 MASTOIDITIS OF LEFT SIDE: ICD-10-CM

## 2022-06-23 LAB
BH CV XLRA MEAS LEFT CAROTID BULB EDV: 14 CM/SEC
BH CV XLRA MEAS LEFT CAROTID BULB PSV: 49 CM/SEC
BH CV XLRA MEAS LEFT DIST CCA EDV: 29 CM/SEC
BH CV XLRA MEAS LEFT DIST CCA PSV: 95 CM/SEC
BH CV XLRA MEAS LEFT DIST ICA EDV: 40 CM/SEC
BH CV XLRA MEAS LEFT DIST ICA PSV: 81 CM/SEC
BH CV XLRA MEAS LEFT MID ICA EDV: 42 CM/SEC
BH CV XLRA MEAS LEFT MID ICA PSV: 81 CM/SEC
BH CV XLRA MEAS LEFT PROX CCA EDV: 38 CM/SEC
BH CV XLRA MEAS LEFT PROX CCA PSV: 120 CM/SEC
BH CV XLRA MEAS LEFT PROX ECA EDV: 32 CM/SEC
BH CV XLRA MEAS LEFT PROX ECA PSV: 104 CM/SEC
BH CV XLRA MEAS LEFT PROX ICA EDV: 21 CM/SEC
BH CV XLRA MEAS LEFT PROX ICA PSV: 52 CM/SEC
BH CV XLRA MEAS LEFT VERTEBRAL A EDV: 12 CM/SEC
BH CV XLRA MEAS LEFT VERTEBRAL A PSV: 49 CM/SEC
BH CV XLRA MEAS RIGHT CAROTID BULB EDV: 14 CM/SEC
BH CV XLRA MEAS RIGHT CAROTID BULB PSV: 58 CM/SEC
BH CV XLRA MEAS RIGHT DIST CCA EDV: 41 CM/SEC
BH CV XLRA MEAS RIGHT DIST CCA PSV: 111 CM/SEC
BH CV XLRA MEAS RIGHT DIST ICA EDV: 46 CM/SEC
BH CV XLRA MEAS RIGHT DIST ICA PSV: 93 CM/SEC
BH CV XLRA MEAS RIGHT MID ICA EDV: 43 CM/SEC
BH CV XLRA MEAS RIGHT MID ICA PSV: 103 CM/SEC
BH CV XLRA MEAS RIGHT PROX CCA EDV: 36 CM/SEC
BH CV XLRA MEAS RIGHT PROX CCA PSV: 104 CM/SEC
BH CV XLRA MEAS RIGHT PROX ECA EDV: 22 CM/SEC
BH CV XLRA MEAS RIGHT PROX ECA PSV: 80 CM/SEC
BH CV XLRA MEAS RIGHT PROX ICA EDV: 29 CM/SEC
BH CV XLRA MEAS RIGHT PROX ICA PSV: 72 CM/SEC
BH CV XLRA MEAS RIGHT VERTEBRAL A EDV: 28 CM/SEC
BH CV XLRA MEAS RIGHT VERTEBRAL A PSV: 77 CM/SEC
LEFT ARM BP: NORMAL MMHG
MAXIMAL PREDICTED HEART RATE: 175 BPM
RIGHT ARM BP: NORMAL MMHG
STRESS TARGET HR: 149 BPM

## 2022-06-23 PROCEDURE — 70553 MRI BRAIN STEM W/O & W/DYE: CPT

## 2022-06-23 PROCEDURE — 93880 EXTRACRANIAL BILAT STUDY: CPT | Performed by: SURGERY

## 2022-06-23 PROCEDURE — 0 GADOBENATE DIMEGLUMINE 529 MG/ML SOLUTION: Performed by: FAMILY MEDICINE

## 2022-06-23 PROCEDURE — A9577 INJ MULTIHANCE: HCPCS | Performed by: FAMILY MEDICINE

## 2022-06-23 PROCEDURE — 93880 EXTRACRANIAL BILAT STUDY: CPT

## 2022-06-23 RX ORDER — CLINDAMYCIN HYDROCHLORIDE 300 MG/1
300 CAPSULE ORAL 4 TIMES DAILY
Qty: 28 CAPSULE | Refills: 0 | Status: SHIPPED | OUTPATIENT
Start: 2022-06-23 | End: 2022-06-24 | Stop reason: SDUPTHER

## 2022-06-23 RX ORDER — SACCHAROMYCES BOULARDII 250 MG
250 CAPSULE ORAL 2 TIMES DAILY
Qty: 20 CAPSULE | Refills: 0 | Status: SHIPPED | OUTPATIENT
Start: 2022-06-23 | End: 2022-06-24 | Stop reason: SDUPTHER

## 2022-06-23 RX ADMIN — GADOBENATE DIMEGLUMINE 12 ML: 529 INJECTION, SOLUTION INTRAVENOUS at 14:35

## 2022-06-23 NOTE — PROGRESS NOTES
Call pt:  MRI showed mastoiditis, which is inflammation behind the left ear, caused by left inner and outer ear infection.  I sent clindamycin and probiotic to her pharmacy for this.  I also referred to ENT for this also.  This is likely causing her dizziness.  Follow-up in office next week if still having symptoms or if symptoms worsen.

## 2022-06-24 ENCOUNTER — TELEPHONE (OUTPATIENT)
Dept: FAMILY MEDICINE CLINIC | Facility: CLINIC | Age: 47
End: 2022-06-24

## 2022-06-24 DIAGNOSIS — H70.92 MASTOIDITIS OF LEFT SIDE: ICD-10-CM

## 2022-06-24 DIAGNOSIS — H83.02 INFECTION OF LEFT INNER EAR: ICD-10-CM

## 2022-06-24 RX ORDER — CLINDAMYCIN HYDROCHLORIDE 300 MG/1
300 CAPSULE ORAL 4 TIMES DAILY
Qty: 28 CAPSULE | Refills: 0 | Status: SHIPPED | OUTPATIENT
Start: 2022-06-24 | End: 2022-07-01

## 2022-06-24 RX ORDER — CLINDAMYCIN HYDROCHLORIDE 300 MG/1
300 CAPSULE ORAL 4 TIMES DAILY
Qty: 28 CAPSULE | Refills: 0 | OUTPATIENT
Start: 2022-06-24 | End: 2022-07-01

## 2022-06-24 RX ORDER — SACCHAROMYCES BOULARDII 250 MG
250 CAPSULE ORAL 2 TIMES DAILY
Qty: 20 CAPSULE | Refills: 0 | Status: SHIPPED | OUTPATIENT
Start: 2022-06-24 | End: 2022-07-04

## 2022-06-24 RX ORDER — SACCHAROMYCES BOULARDII 250 MG
250 CAPSULE ORAL 2 TIMES DAILY
Qty: 20 CAPSULE | Refills: 0 | OUTPATIENT
Start: 2022-06-24 | End: 2022-07-04

## 2022-06-24 NOTE — TELEPHONE ENCOUNTER
Caller: Magi Neumann    Relationship: Self    Best call back number: [phone]    Requested Prescriptions:   Requested Prescriptions     Pending Prescriptions Disp Refills   • clindamycin (Cleocin) 300 MG capsule 28 capsule 0     Sig: Take 1 capsule by mouth 4 (Four) Times a Day for 7 days.   • saccharomyces boulardii (Florastor) 250 MG capsule 20 capsule 0     Sig: Take 1 capsule by mouth 2 (Two) Times a Day for 10 days.    gadobenate dimeglumine (MULTIHANCE) injection 12 mL  PREVACID    Pharmacy where request should be sent: [facility] [address] [phone] [facility] [phone] [facility]     Additional details provided by patient: PATIENT CALLED STATING HER MEDICATION WAS SENT TO THE WRONG PHARMACY WHEN SHE DISCHARGED FROM [facility]    Does the patient have less than a 3 day supply:  [x] Yes  [] No    Bo Muñoz Rep   06/24/22 08:30 EDT

## 2022-06-24 NOTE — TELEPHONE ENCOUNTER
HUB WAS UNABLE TO WARM TRANSFER     Caller: Short, Magi    Relationship: Self    Best call back number: [phone]    What is the best time to reach you: ANYTIME     Who are you requesting to speak with (clinical staff, provider,  specific staff member): CLINICAL     What was the call regarding: YES     Do you require a callback: PATIENT IS CALLING TO CHECK ON MEDICATION, STATED [facility] WAS UNABLE TO GET A HOLD OF, AND THE MEDICATION IS NEEDED ASAP.

## 2022-07-08 ENCOUNTER — TELEPHONE (OUTPATIENT)
Dept: NEUROLOGY | Facility: CLINIC | Age: 47
End: 2022-07-08

## 2022-07-08 NOTE — TELEPHONE ENCOUNTER
Called and lvm for pt to call and rs her appt as provider will be out of the office. If pt calls back, she is good to be rs in the next available new pt spot with Berenice Delgado. Thanks.

## 2022-07-11 ENCOUNTER — PATIENT MESSAGE (OUTPATIENT)
Dept: NEUROLOGY | Facility: CLINIC | Age: 47
End: 2022-07-11

## 2022-07-13 ENCOUNTER — TELEPHONE (OUTPATIENT)
Dept: CARDIOLOGY | Facility: CLINIC | Age: 47
End: 2022-07-13

## 2022-07-13 DIAGNOSIS — R07.9 CHEST PAIN, UNSPECIFIED TYPE: Primary | ICD-10-CM

## 2022-07-13 NOTE — TELEPHONE ENCOUNTER
----- Message from YULI Jarrett sent at 7/12/2022  3:26 PM EDT -----  Notify pt treadmill results: Patient exercised on a treadmill for less than a minute.  Treadmill stress test negative.  However in view of her limited exercise tolerance would suggest Lexiscan sestamibi stress test. If she is agreeable to nuclear stress, I will place orders

## 2022-07-19 DIAGNOSIS — E87.6 HYPOKALEMIA: ICD-10-CM

## 2022-07-19 RX ORDER — POTASSIUM CHLORIDE 600 MG/1
CAPSULE, EXTENDED RELEASE ORAL
Qty: 30 CAPSULE | OUTPATIENT
Start: 2022-07-19

## 2022-08-02 ENCOUNTER — OFFICE VISIT (OUTPATIENT)
Dept: FAMILY MEDICINE CLINIC | Facility: CLINIC | Age: 47
End: 2022-08-02

## 2022-08-02 VITALS
WEIGHT: 127 LBS | DIASTOLIC BLOOD PRESSURE: 70 MMHG | HEIGHT: 66 IN | OXYGEN SATURATION: 94 % | SYSTOLIC BLOOD PRESSURE: 128 MMHG | TEMPERATURE: 97.6 F | HEART RATE: 91 BPM | BODY MASS INDEX: 20.41 KG/M2

## 2022-08-02 DIAGNOSIS — E87.6 HYPOKALEMIA: ICD-10-CM

## 2022-08-02 DIAGNOSIS — K21.9 GASTROESOPHAGEAL REFLUX DISEASE, UNSPECIFIED WHETHER ESOPHAGITIS PRESENT: ICD-10-CM

## 2022-08-02 DIAGNOSIS — R05.9 COUGH: ICD-10-CM

## 2022-08-02 DIAGNOSIS — J02.9 PHARYNGITIS, UNSPECIFIED ETIOLOGY: ICD-10-CM

## 2022-08-02 DIAGNOSIS — R73.09 ELEVATED RANDOM BLOOD GLUCOSE LEVEL: Primary | ICD-10-CM

## 2022-08-02 DIAGNOSIS — M54.50 ACUTE BILATERAL LOW BACK PAIN WITHOUT SCIATICA: ICD-10-CM

## 2022-08-02 DIAGNOSIS — R25.2 MUSCLE CRAMPS: ICD-10-CM

## 2022-08-02 PROCEDURE — 85025 COMPLETE CBC W/AUTO DIFF WBC: CPT | Performed by: FAMILY MEDICINE

## 2022-08-02 PROCEDURE — 99214 OFFICE O/P EST MOD 30 MIN: CPT | Performed by: FAMILY MEDICINE

## 2022-08-02 PROCEDURE — 83735 ASSAY OF MAGNESIUM: CPT | Performed by: FAMILY MEDICINE

## 2022-08-02 PROCEDURE — 80053 COMPREHEN METABOLIC PANEL: CPT | Performed by: FAMILY MEDICINE

## 2022-08-02 PROCEDURE — 82550 ASSAY OF CK (CPK): CPT | Performed by: FAMILY MEDICINE

## 2022-08-02 PROCEDURE — U0004 COV-19 TEST NON-CDC HGH THRU: HCPCS | Performed by: FAMILY MEDICINE

## 2022-08-02 RX ORDER — SACCHAROMYCES BOULARDII 250 MG
250 CAPSULE ORAL 2 TIMES DAILY
Qty: 20 CAPSULE | Refills: 0 | Status: SHIPPED | OUTPATIENT
Start: 2022-08-02 | End: 2022-08-12

## 2022-08-02 RX ORDER — ALBUTEROL SULFATE 90 UG/1
2 AEROSOL, METERED RESPIRATORY (INHALATION) EVERY 6 HOURS PRN
Qty: 18 G | Refills: 1 | Status: SHIPPED | OUTPATIENT
Start: 2022-08-02 | End: 2023-01-10 | Stop reason: SDUPTHER

## 2022-08-02 RX ORDER — CEFDINIR 300 MG/1
300 CAPSULE ORAL 2 TIMES DAILY
Qty: 14 CAPSULE | Refills: 0 | Status: SHIPPED | OUTPATIENT
Start: 2022-08-02 | End: 2022-08-09

## 2022-08-02 RX ORDER — ALBUTEROL SULFATE 1.25 MG/3ML
1 SOLUTION RESPIRATORY (INHALATION) EVERY 6 HOURS PRN
Qty: 360 ML | Refills: 5 | Status: SHIPPED | OUTPATIENT
Start: 2022-08-02

## 2022-08-02 RX ORDER — POTASSIUM CHLORIDE 600 MG/1
8 TABLET, FILM COATED, EXTENDED RELEASE ORAL DAILY
Qty: 30 TABLET | Refills: 5 | Status: SHIPPED | OUTPATIENT
Start: 2022-08-02 | End: 2023-01-30

## 2022-08-02 RX ORDER — FAMOTIDINE 20 MG/1
20 TABLET, FILM COATED ORAL NIGHTLY PRN
Qty: 30 TABLET | Refills: 5 | Status: SHIPPED | OUTPATIENT
Start: 2022-08-02 | End: 2023-01-30

## 2022-08-02 RX ORDER — PREDNISONE 20 MG/1
40 TABLET ORAL DAILY
Qty: 10 TABLET | Refills: 0 | Status: SHIPPED | OUTPATIENT
Start: 2022-08-02 | End: 2022-08-07

## 2022-08-02 RX ORDER — BACLOFEN 10 MG/1
10 TABLET ORAL 3 TIMES DAILY PRN
Qty: 30 TABLET | Refills: 1 | Status: SHIPPED | OUTPATIENT
Start: 2022-08-02 | End: 2022-10-06

## 2022-08-02 NOTE — PROGRESS NOTES
Chief Complaint  Med Refill (Bad leg cramps, medication refills, she is having lower back pain and discomfort. )    Subjective          Magi Short presents to [facility]  Pt will be getting a nuclear stress test soon from cardiology- they are following pt and treating  Pt has vitamin b12 deficiency  Discussed labs    URI   This is a new problem. Episode onset: 5 days. The problem has been gradually worsening. There has been no fever. Associated symptoms include congestion, coughing and a sore throat. Pertinent negatives include no abdominal pain, chest pain, diarrhea, dysuria, ear pain, headaches, joint pain, joint swelling, nausea, neck pain, plugged ear sensation, rash, rhinorrhea, sinus pain, sneezing, swollen glands, vomiting or wheezing. She has tried nothing for the symptoms.   Back Pain  This is a chronic problem. The current episode started more than 1 year ago. The problem occurs intermittently. The problem is unchanged. The pain is present in the lumbar spine. The pain does not radiate. The pain is moderate. The symptoms are aggravated by bending and twisting. Pertinent negatives include no abdominal pain, bladder incontinence, bowel incontinence, chest pain, dysuria, fever, headaches, leg pain, numbness, paresis, paresthesias, pelvic pain, perianal numbness, tingling, weakness or weight loss. She has tried nothing for the symptoms.       Objective   No Known Allergies    There is no immunization history on file for this patient.  Past Medical History:   Diagnosis Date   • Chronic pancreatitis (HCC)    • COPD (chronic obstructive pulmonary disease) (HCC)       Past Surgical History:   Procedure Laterality Date   •  SECTION      x2   • CHOLECYSTECTOMY     • D & C WITH SUCTION      x2   • PANCREAS SURGERY     • TUBAL ABDOMINAL LIGATION        Social History     Socioeconomic History   • Marital status:    Tobacco Use   • Smoking status: Current Every Day  Smoker   • Smokeless tobacco: Never Used   Vaping Use   • Vaping Use: Never used   Substance and Sexual Activity   • Alcohol use: Never   • Drug use: Never   • Sexual activity: Never     Birth control/protection: None        Current Outpatient Medications:   •  albuterol (ACCUNEB) 1.25 MG/3ML nebulizer solution, Take 3 mL by nebulization Every 6 (Six) Hours As Needed for Wheezing., Disp: 360 mL, Rfl: 5  •  albuterol sulfate  (90 Base) MCG/ACT inhaler, Inhale 2 puffs Every 6 (Six) Hours As Needed for Wheezing., Disp: 18 g, Rfl: 1  •  buprenorphine-naloxone (SUBOXONE) 8-2 MG per SL tablet, , Disp: , Rfl:   •  estradiol (ESTRACE) 0.1 MG/GM vaginal cream, Insert 1 g into the vagina 2 (Two) Times a Week. Insert 1 gram into the vaginal every night for 2 weeks then twice weekly, Disp: 42 g, Rfl: 5  •  famotidine (PEPCID) 20 MG tablet, Take 1 tablet by mouth At Night As Needed for Heartburn., Disp: 30 tablet, Rfl: 5  •  fluticasone (FLONASE) 50 MCG/ACT nasal spray, 2 sprays by Each Nare route Daily., Disp: , Rfl:   •  gabapentin (NEURONTIN) 100 MG capsule, , Disp: , Rfl:   •  gabapentin (NEURONTIN) 300 MG capsule, Take 300 mg by mouth 3 (Three) Times a Day., Disp: , Rfl:   •  meclizine (ANTIVERT) 25 MG tablet, Take 1 tablet by mouth 3 (Three) Times a Day As Needed for Dizziness., Disp: 21 tablet, Rfl: 0  •  potassium chloride (KLOR-CON) 8 MEQ CR tablet, Take 1 tablet by mouth Daily., Disp: 30 tablet, Rfl: 5  •  Symbicort 160-4.5 MCG/ACT inhaler, Inhale 2 puffs 2 (Two) Times a Day., Disp: 10.2 g, Rfl: 5  •  baclofen (LIORESAL) 10 MG tablet, Take 1 tablet by mouth 3 (Three) Times a Day As Needed for Muscle Spasms., Disp: 30 tablet, Rfl: 1  •  cefdinir (OMNICEF) 300 MG capsule, Take 1 capsule by mouth 2 (Two) Times a Day for 7 days., Disp: 14 capsule, Rfl: 0  •  predniSONE (DELTASONE) 20 MG tablet, Take 2 tablets by mouth Daily for 5 days., Disp: 10 tablet, Rfl: 0  •  saccharomyces boulardii (Florastor) 250 MG capsule,  "Take 1 capsule by mouth 2 (Two) Times a Day for 10 days., Disp: 20 capsule, Rfl: 0   History reviewed. No pertinent family history.       Vital Signs:   Vitals:    08/02/22 1648   BP: 128/70   BP Location: Left arm   Patient Position: Sitting   Cuff Size: Adult   Pulse: 91   Temp: 97.6 °F (36.4 °C)   TempSrc: Temporal   SpO2: 94%   Weight: 57.6 kg (127 lb)   Height: 167.6 cm (66\")       Review of Systems   Constitutional: Negative for fever and weight loss.   HENT: Positive for congestion and sore throat. Negative for ear pain, rhinorrhea, sinus pain and sneezing.    Respiratory: Positive for cough. Negative for wheezing.    Cardiovascular: Negative for chest pain.   Gastrointestinal: Negative for abdominal pain, bowel incontinence, diarrhea, nausea and vomiting.   Genitourinary: Negative for bladder incontinence, dysuria and pelvic pain.   Musculoskeletal: Positive for back pain. Negative for joint pain and neck pain.   Skin: Negative for rash.   Neurological: Negative for tingling, weakness, numbness, headaches and paresthesias.      Physical Exam  Vitals reviewed.   Constitutional:       Appearance: Normal appearance. She is well-developed.   HENT:      Head: Normocephalic and atraumatic.      Right Ear: External ear normal.      Left Ear: External ear normal.      Mouth/Throat:      Pharynx: No oropharyngeal exudate.   Eyes:      Conjunctiva/sclera: Conjunctivae normal.      Pupils: Pupils are equal, round, and reactive to light.   Cardiovascular:      Rate and Rhythm: Normal rate and regular rhythm.      Pulses: Normal pulses.      Heart sounds: Normal heart sounds. No murmur heard.    No friction rub. No gallop.   Pulmonary:      Effort: Pulmonary effort is normal.      Breath sounds: Normal breath sounds. No wheezing or rhonchi.   Abdominal:      General: Abdomen is flat. Bowel sounds are normal. There is no distension.      Palpations: Abdomen is soft. There is no mass.      Tenderness: There is no abdominal " tenderness. There is no guarding or rebound.      Hernia: No hernia is present.   Musculoskeletal:         General: Normal range of motion.   Skin:     General: Skin is warm and dry.      Capillary Refill: Capillary refill takes less than 2 seconds.   Neurological:      General: No focal deficit present.      Mental Status: She is alert and oriented to person, place, and time.      Cranial Nerves: No cranial nerve deficit.   Psychiatric:         Mood and Affect: Mood and affect normal.         Behavior: Behavior normal.         Thought Content: Thought content normal.         Judgment: Judgment normal.        Result Review :   The following data was reviewed by: Shivam Arteaga MD on 08/02/2022:  CMP    CMP 1/28/22 5/19/22   Glucose 97 108 (A)   BUN 7 11   Creatinine 0.54 (A) 1.03 (A)   eGFR Non African Am 123    Sodium 137 137   Potassium 4.1 3.2 (A)   Chloride 102 93 (A)   Calcium 9.0 10.0   Albumin 3.70 3.80   Total Bilirubin 0.2 0.3   Alkaline Phosphatase 123 (A) 113   AST (SGOT) 24 26   ALT (SGPT) 15 14   (A) Abnormal value            CBC    CBC 1/28/22 2/24/22 5/19/22   WBC 4.89 9.55 8.88   RBC 4.54 4.90 5.28   Hemoglobin 12.9 14.3 15.4   Hematocrit 39.2 42.1 46.5   MCV 86.3 85.9 88.1   MCH 28.4 29.2 29.2   MCHC 32.9 34.0 33.1   RDW 14.1 14.2 14.6   Platelets 278 306 380               TSH    TSH 1/28/22 2/24/22 5/19/22   TSH 1.410 1.290 2.240           Data reviewed: Radiologic studies I viewed and interpreted 3 views lumbar spine x-rays and 2 views chest x-rays:no fxs, NAD.          Assessment and Plan    Diagnoses and all orders for this visit:    1. Elevated random blood glucose level (Primary)    2. Hypokalemia  -     potassium chloride (KLOR-CON) 8 MEQ CR tablet; Take 1 tablet by mouth Daily.  Dispense: 30 tablet; Refill: 5  -     Comprehensive Metabolic Panel    3. Cough  -     albuterol (ACCUNEB) 1.25 MG/3ML nebulizer solution; Take 3 mL by nebulization Every 6 (Six) Hours As Needed for Wheezing.   Dispense: 360 mL; Refill: 5  -     albuterol sulfate  (90 Base) MCG/ACT inhaler; Inhale 2 puffs Every 6 (Six) Hours As Needed for Wheezing.  Dispense: 18 g; Refill: 1  -     XR Chest PA & Lateral (In Office)  -     CBC Auto Differential  -     COVID-19,APTIMA PANTHER(TOÑO),[facility], NP/OP SWAB IN UTM/VTM/[facility] TRANSPORT MEDIA,24 HR TAT - Swab, Nasopharynx    4. Muscle cramps  -     CBC Auto Differential  -     Comprehensive Metabolic Panel  -     Magnesium  -     CK    5. Acute bilateral low back pain without sciatica  -     XR Spine Lumbar 2 or 3 View (In Office)  -     baclofen (LIORESAL) 10 MG tablet; Take 1 tablet by mouth 3 (Three) Times a Day As Needed for Muscle Spasms.  Dispense: 30 tablet; Refill: 1  -     Ambulatory Referral to Physical Therapy    6. Gastroesophageal reflux disease, unspecified whether esophagitis present  -     famotidine (PEPCID) 20 MG tablet; Take 1 tablet by mouth At Night As Needed for Heartburn.  Dispense: 30 tablet; Refill: 5    7. Pharyngitis, unspecified etiology  -     cefdinir (OMNICEF) 300 MG capsule; Take 1 capsule by mouth 2 (Two) Times a Day for 7 days.  Dispense: 14 capsule; Refill: 0  -     saccharomyces boulardii (Florastor) 250 MG capsule; Take 1 capsule by mouth 2 (Two) Times a Day for 10 days.  Dispense: 20 capsule; Refill: 0  -     predniSONE (DELTASONE) 20 MG tablet; Take 2 tablets by mouth Daily for 5 days.  Dispense: 10 tablet; Refill: 0            Follow Up   No follow-ups on file.  Patient was given instructions and counseling regarding her condition or for health maintenance advice. Please see specific information pulled into the AVS if appropriate.

## 2022-08-02 NOTE — PROGRESS NOTES
Venipuncture Blood Specimen Collection  Venipuncture performed in left arm by Velma Sewell with good hemostasis. Patient tolerated the procedure well without complications.   08/02/22   Velma Sewell

## 2022-08-03 LAB
ALBUMIN SERPL-MCNC: 3.7 G/DL (ref 3.5–5.2)
ALBUMIN/GLOB SERPL: 1.4 G/DL
ALP SERPL-CCNC: 112 U/L (ref 39–117)
ALT SERPL W P-5'-P-CCNC: 11 U/L (ref 1–33)
ANION GAP SERPL CALCULATED.3IONS-SCNC: 8.9 MMOL/L (ref 5–15)
AST SERPL-CCNC: 15 U/L (ref 1–32)
BASOPHILS # BLD AUTO: 0.08 10*3/MM3 (ref 0–0.2)
BASOPHILS NFR BLD AUTO: 1 % (ref 0–1.5)
BILIRUB SERPL-MCNC: 0.2 MG/DL (ref 0–1.2)
BUN SERPL-MCNC: 9 MG/DL (ref 6–20)
BUN/CREAT SERPL: 12.7 (ref 7–25)
CALCIUM SPEC-SCNC: 9.2 MG/DL (ref 8.6–10.5)
CHLORIDE SERPL-SCNC: 102 MMOL/L (ref 98–107)
CK SERPL-CCNC: 85 U/L (ref 20–180)
CO2 SERPL-SCNC: 26.1 MMOL/L (ref 22–29)
CREAT SERPL-MCNC: 0.71 MG/DL (ref 0.57–1)
DEPRECATED RDW RBC AUTO: 48.8 FL (ref 37–54)
EGFRCR SERPLBLD CKD-EPI 2021: 107 ML/MIN/1.73
EOSINOPHIL # BLD AUTO: 0.23 10*3/MM3 (ref 0–0.4)
EOSINOPHIL NFR BLD AUTO: 3 % (ref 0.3–6.2)
ERYTHROCYTE [DISTWIDTH] IN BLOOD BY AUTOMATED COUNT: 15.3 % (ref 12.3–15.4)
GLOBULIN UR ELPH-MCNC: 2.7 GM/DL
GLUCOSE SERPL-MCNC: 83 MG/DL (ref 65–99)
HCT VFR BLD AUTO: 39.2 % (ref 34–46.6)
HGB BLD-MCNC: 13.2 G/DL (ref 12–15.9)
IMM GRANULOCYTES # BLD AUTO: 0.02 10*3/MM3 (ref 0–0.05)
IMM GRANULOCYTES NFR BLD AUTO: 0.3 % (ref 0–0.5)
LYMPHOCYTES # BLD AUTO: 2.95 10*3/MM3 (ref 0.7–3.1)
LYMPHOCYTES NFR BLD AUTO: 38.6 % (ref 19.6–45.3)
MAGNESIUM SERPL-MCNC: 1.9 MG/DL (ref 1.6–2.6)
MCH RBC QN AUTO: 30.1 PG (ref 26.6–33)
MCHC RBC AUTO-ENTMCNC: 33.7 G/DL (ref 31.5–35.7)
MCV RBC AUTO: 89.5 FL (ref 79–97)
MONOCYTES # BLD AUTO: 0.6 10*3/MM3 (ref 0.1–0.9)
MONOCYTES NFR BLD AUTO: 7.8 % (ref 5–12)
NEUTROPHILS NFR BLD AUTO: 3.77 10*3/MM3 (ref 1.7–7)
NEUTROPHILS NFR BLD AUTO: 49.3 % (ref 42.7–76)
NRBC BLD AUTO-RTO: 0 /100 WBC (ref 0–0.2)
PLATELET # BLD AUTO: 350 10*3/MM3 (ref 140–450)
PMV BLD AUTO: 11.3 FL (ref 6–12)
POTASSIUM SERPL-SCNC: 4.2 MMOL/L (ref 3.5–5.2)
PROT SERPL-MCNC: 6.4 G/DL (ref 6–8.5)
RBC # BLD AUTO: 4.38 10*6/MM3 (ref 3.77–5.28)
SARS-COV-2 RNA PNL SPEC NAA+PROBE: NOT DETECTED
SODIUM SERPL-SCNC: 137 MMOL/L (ref 136–145)
WBC NRBC COR # BLD: 7.65 10*3/MM3 (ref 3.4–10.8)

## 2022-08-30 DIAGNOSIS — M54.50 ACUTE BILATERAL LOW BACK PAIN WITHOUT SCIATICA: ICD-10-CM

## 2022-08-30 RX ORDER — BACLOFEN 10 MG/1
TABLET ORAL
Qty: 30 TABLET | Refills: 1 | OUTPATIENT
Start: 2022-08-30

## 2022-10-02 DIAGNOSIS — R05.9 COUGH: ICD-10-CM

## 2022-10-03 RX ORDER — BUDESONIDE AND FORMOTEROL FUMARATE DIHYDRATE 160; 4.5 UG/1; UG/1
AEROSOL RESPIRATORY (INHALATION)
Qty: 10.2 G | Refills: 5 | Status: SHIPPED | OUTPATIENT
Start: 2022-10-03

## 2022-10-06 ENCOUNTER — OFFICE VISIT (OUTPATIENT)
Dept: FAMILY MEDICINE CLINIC | Facility: CLINIC | Age: 47
End: 2022-10-06

## 2022-10-06 VITALS
HEART RATE: 85 BPM | WEIGHT: 132 LBS | OXYGEN SATURATION: 99 % | BODY MASS INDEX: 21.31 KG/M2 | DIASTOLIC BLOOD PRESSURE: 80 MMHG | SYSTOLIC BLOOD PRESSURE: 102 MMHG | TEMPERATURE: 98 F

## 2022-10-06 DIAGNOSIS — J40 BRONCHITIS: ICD-10-CM

## 2022-10-06 DIAGNOSIS — R05.1 ACUTE COUGH: Primary | ICD-10-CM

## 2022-10-06 DIAGNOSIS — R25.2 MUSCLE CRAMPS: ICD-10-CM

## 2022-10-06 DIAGNOSIS — B37.0 THRUSH: ICD-10-CM

## 2022-10-06 DIAGNOSIS — R09.1 PLEURISY: ICD-10-CM

## 2022-10-06 DIAGNOSIS — J02.9 PHARYNGITIS, UNSPECIFIED ETIOLOGY: ICD-10-CM

## 2022-10-06 LAB
ALBUMIN SERPL-MCNC: 4.2 G/DL (ref 3.5–5.2)
ALBUMIN/GLOB SERPL: 1.6 G/DL
ALP SERPL-CCNC: 144 U/L (ref 39–117)
ALT SERPL W P-5'-P-CCNC: 7 U/L (ref 1–33)
ANION GAP SERPL CALCULATED.3IONS-SCNC: 7 MMOL/L (ref 5–15)
AST SERPL-CCNC: 16 U/L (ref 1–32)
BASOPHILS # BLD AUTO: 0.11 10*3/MM3 (ref 0–0.2)
BASOPHILS NFR BLD AUTO: 0.8 % (ref 0–1.5)
BILIRUB SERPL-MCNC: 0.3 MG/DL (ref 0–1.2)
BUN SERPL-MCNC: 10 MG/DL (ref 6–20)
BUN/CREAT SERPL: 12 (ref 7–25)
CALCIUM SPEC-SCNC: 9.5 MG/DL (ref 8.6–10.5)
CHLORIDE SERPL-SCNC: 94 MMOL/L (ref 98–107)
CK SERPL-CCNC: 207 U/L (ref 20–180)
CO2 SERPL-SCNC: 35 MMOL/L (ref 22–29)
CREAT SERPL-MCNC: 0.83 MG/DL (ref 0.57–1)
DEPRECATED RDW RBC AUTO: 43.4 FL (ref 37–54)
EGFRCR SERPLBLD CKD-EPI 2021: 88.7 ML/MIN/1.73
EOSINOPHIL # BLD AUTO: 0.83 10*3/MM3 (ref 0–0.4)
EOSINOPHIL NFR BLD AUTO: 6.1 % (ref 0.3–6.2)
ERYTHROCYTE [DISTWIDTH] IN BLOOD BY AUTOMATED COUNT: 13.5 % (ref 12.3–15.4)
GLOBULIN UR ELPH-MCNC: 2.7 GM/DL
GLUCOSE SERPL-MCNC: 98 MG/DL (ref 65–99)
HCT VFR BLD AUTO: 41.2 % (ref 34–46.6)
HGB BLD-MCNC: 13.6 G/DL (ref 12–15.9)
IMM GRANULOCYTES # BLD AUTO: 0.04 10*3/MM3 (ref 0–0.05)
IMM GRANULOCYTES NFR BLD AUTO: 0.3 % (ref 0–0.5)
LYMPHOCYTES # BLD AUTO: 3.6 10*3/MM3 (ref 0.7–3.1)
LYMPHOCYTES NFR BLD AUTO: 26.5 % (ref 19.6–45.3)
MAGNESIUM SERPL-MCNC: 2 MG/DL (ref 1.6–2.6)
MCH RBC QN AUTO: 29.5 PG (ref 26.6–33)
MCHC RBC AUTO-ENTMCNC: 33 G/DL (ref 31.5–35.7)
MCV RBC AUTO: 89.4 FL (ref 79–97)
MONOCYTES # BLD AUTO: 0.82 10*3/MM3 (ref 0.1–0.9)
MONOCYTES NFR BLD AUTO: 6 % (ref 5–12)
NEUTROPHILS NFR BLD AUTO: 60.3 % (ref 42.7–76)
NEUTROPHILS NFR BLD AUTO: 8.16 10*3/MM3 (ref 1.7–7)
NRBC BLD AUTO-RTO: 0 /100 WBC (ref 0–0.2)
PLATELET # BLD AUTO: 374 10*3/MM3 (ref 140–450)
PMV BLD AUTO: 10.6 FL (ref 6–12)
POTASSIUM SERPL-SCNC: 2.8 MMOL/L (ref 3.5–5.2)
PROT SERPL-MCNC: 6.9 G/DL (ref 6–8.5)
RBC # BLD AUTO: 4.61 10*6/MM3 (ref 3.77–5.28)
SODIUM SERPL-SCNC: 136 MMOL/L (ref 136–145)
WBC NRBC COR # BLD: 13.56 10*3/MM3 (ref 3.4–10.8)

## 2022-10-06 PROCEDURE — 85025 COMPLETE CBC W/AUTO DIFF WBC: CPT | Performed by: FAMILY MEDICINE

## 2022-10-06 PROCEDURE — 80053 COMPREHEN METABOLIC PANEL: CPT | Performed by: FAMILY MEDICINE

## 2022-10-06 PROCEDURE — 82550 ASSAY OF CK (CPK): CPT | Performed by: FAMILY MEDICINE

## 2022-10-06 PROCEDURE — 83735 ASSAY OF MAGNESIUM: CPT | Performed by: FAMILY MEDICINE

## 2022-10-06 PROCEDURE — 87081 CULTURE SCREEN ONLY: CPT | Performed by: FAMILY MEDICINE

## 2022-10-06 PROCEDURE — 99213 OFFICE O/P EST LOW 20 MIN: CPT | Performed by: FAMILY MEDICINE

## 2022-10-06 RX ORDER — PREDNISONE 20 MG/1
60 TABLET ORAL DAILY
Qty: 15 TABLET | Refills: 0 | Status: SHIPPED | OUTPATIENT
Start: 2022-10-06 | End: 2022-10-11

## 2022-10-06 RX ORDER — DIPHENOXYLATE HYDROCHLORIDE AND ATROPINE SULFATE 2.5; .025 MG/1; MG/1
1 TABLET ORAL DAILY
Qty: 30 TABLET | Refills: 1 | Status: SHIPPED | OUTPATIENT
Start: 2022-10-06

## 2022-10-06 RX ORDER — POTASSIUM CHLORIDE 1500 MG/1
TABLET, FILM COATED, EXTENDED RELEASE ORAL
COMMUNITY
Start: 2022-10-04 | End: 2023-02-17

## 2022-10-06 RX ORDER — PANCRELIPASE 60000; 12000; 38000 [USP'U]/1; [USP'U]/1; [USP'U]/1
CAPSULE, DELAYED RELEASE PELLETS ORAL
COMMUNITY
Start: 2022-10-05

## 2022-10-06 RX ORDER — AZITHROMYCIN 250 MG/1
TABLET, FILM COATED ORAL
Qty: 6 TABLET | Refills: 0 | Status: SHIPPED | OUTPATIENT
Start: 2022-10-06 | End: 2023-01-10

## 2022-10-06 NOTE — PROGRESS NOTES
Venipuncture Blood Specimen Collection  Venipuncture performed in The patient left the office before the visit was finished. Arm  by Maribel Dumas with good hemostasis. Patient tolerated the procedure well without complications.   10/06/22   Maribel Dumas

## 2022-10-07 ENCOUNTER — TELEPHONE (OUTPATIENT)
Dept: FAMILY MEDICINE CLINIC | Facility: CLINIC | Age: 47
End: 2022-10-07

## 2022-10-07 NOTE — TELEPHONE ENCOUNTER
Caller: Thien Magi    Relationship: Self    Best call back number: [phone]    What medication are you requesting: FOR SYMPTOM    What are your current symptoms: MUCUS IN HER CHEST    Have you had these symptoms before:    [x] Yes  [] No    Have you been treated for these symptoms before:   [x] Yes  [] No    If a prescription is needed, what is your preferred pharmacy and phone number: [facility] [id number] [address] [phone] [facility] [phone]      Additional notes: PATIENT HAS MUCUS IN HER CHEST AND WOULD LIKE MEDICATION PRESCRIBED FOR HER THAT INSURANCE WILL COVER THAT WILL BREAK DOWN THE MUCUS IN HER CHEST. SHE TRIES TO COUGH IT UP, BUT NOTHING COMES UP.

## 2022-10-07 NOTE — TELEPHONE ENCOUNTER
Caller: Short, Magi    Relationship: Self    Best call back number: [phone]    Caller requesting test results: PATIENT    What test was performed: STREP TEST, LABS    When was the test performed: YESTERDAY    Where was the test performed: IN OFFICE    Additional notes: PATIENT WOULD LIKE TO KNOW THE RESULTS OF THE STREP TEST AND HER LABS.

## 2022-10-08 LAB — BACTERIA SPEC AEROBE CULT: NORMAL

## 2023-01-10 ENCOUNTER — OFFICE VISIT (OUTPATIENT)
Dept: FAMILY MEDICINE CLINIC | Facility: CLINIC | Age: 48
End: 2023-01-10
Payer: MEDICAID

## 2023-01-10 VITALS
SYSTOLIC BLOOD PRESSURE: 118 MMHG | OXYGEN SATURATION: 96 % | HEART RATE: 90 BPM | BODY MASS INDEX: 21.38 KG/M2 | DIASTOLIC BLOOD PRESSURE: 80 MMHG | WEIGHT: 133 LBS | TEMPERATURE: 97.7 F | HEIGHT: 66 IN

## 2023-01-10 DIAGNOSIS — R09.82 POST-NASAL DRIP: ICD-10-CM

## 2023-01-10 DIAGNOSIS — R05.1 ACUTE COUGH: ICD-10-CM

## 2023-01-10 DIAGNOSIS — B34.9 NONSPECIFIC SYNDROME SUGGESTIVE OF VIRAL ILLNESS: Primary | ICD-10-CM

## 2023-01-10 DIAGNOSIS — J02.9 SORE THROAT: ICD-10-CM

## 2023-01-10 PROCEDURE — 87428 SARSCOV & INF VIR A&B AG IA: CPT

## 2023-01-10 PROCEDURE — 99214 OFFICE O/P EST MOD 30 MIN: CPT

## 2023-01-10 PROCEDURE — 87880 STREP A ASSAY W/OPTIC: CPT

## 2023-01-10 RX ORDER — VALACYCLOVIR HYDROCHLORIDE 1 G/1
1000 TABLET, FILM COATED ORAL 2 TIMES DAILY
COMMUNITY
Start: 2023-01-03

## 2023-01-10 RX ORDER — ALBUTEROL SULFATE 90 UG/1
2 AEROSOL, METERED RESPIRATORY (INHALATION) EVERY 6 HOURS PRN
Qty: 18 G | Refills: 1 | Status: SHIPPED | OUTPATIENT
Start: 2023-01-10

## 2023-01-10 RX ORDER — GABAPENTIN 800 MG/1
800 TABLET ORAL 2 TIMES DAILY
COMMUNITY
Start: 2023-01-03

## 2023-01-10 RX ORDER — QUETIAPINE FUMARATE 25 MG/1
25 TABLET, FILM COATED ORAL
COMMUNITY
Start: 2022-12-01

## 2023-01-10 RX ORDER — FLUTICASONE PROPIONATE 50 MCG
2 SPRAY, SUSPENSION (ML) NASAL DAILY
Qty: 16 G | Refills: 0 | Status: SHIPPED | OUTPATIENT
Start: 2023-01-10

## 2023-01-10 RX ORDER — METHYLPREDNISOLONE 4 MG/1
TABLET ORAL
Qty: 21 TABLET | Refills: 0 | Status: SHIPPED | OUTPATIENT
Start: 2023-01-10 | End: 2023-02-07

## 2023-01-10 NOTE — PROGRESS NOTES
Chief Complaint  Shortness of Breath (Hurts to breath, causing pain in back, started yesterday) and Cough (And congestion)    Subjective          Magi Neumann presents to [facility]  History of Present Illness    Magi is here for shortness of breath, cough and congestion. She said it is painful to breathe, she has pain in her back that started yesterday. Pain is on the right side of her back. She is having difficulty taking a deep breath. She said she has been coughing for 5-6 days. She denies a fever - maybe for a few days, but isn't sure. Took an at home COVID test. Taking ibuprofen at home.      Objective   Vital Signs:   /80 (BP Location: Left arm)   Pulse 90   Temp 97.7 °F (36.5 °C)   Ht 167.6 cm (66\")   Wt 60.3 kg (133 lb)   SpO2 96%   BMI 21.47 kg/m²     Physical Exam  Vitals reviewed.   Constitutional:       Appearance: Normal appearance. She is well-developed.   HENT:      Head: Normocephalic and atraumatic.      Right Ear: Tympanic membrane, ear canal and external ear normal.      Left Ear: Tympanic membrane, ear canal and external ear normal.      Mouth/Throat:      Pharynx: Posterior oropharyngeal erythema (PND) present. No oropharyngeal exudate.   Eyes:      Conjunctiva/sclera: Conjunctivae normal.      Pupils: Pupils are equal, round, and reactive to light.   Cardiovascular:      Rate and Rhythm: Normal rate and regular rhythm.      Heart sounds: No murmur heard.    No friction rub. No gallop.   Pulmonary:      Effort: Pulmonary effort is normal.      Breath sounds: Normal breath sounds. No wheezing or rhonchi.   Musculoskeletal:      Cervical back: Normal range of motion.   Skin:     General: Skin is warm and dry.   Neurological:      Mental Status: She is alert and oriented to person, place, and time.   Psychiatric:         Mood and Affect: Affect normal.        Result Review :     Influenza A&B    [facility] 4/12/22   Rapid Influenza A Ag Negative    Rapid Influenza B Ag Negative           Covid Tests    [facility] 8/2/22   COVID19 Not Detected           Strep    [facility] 1/10/23   POC Strep A, Molecular Negative              Procedures      Assessment and Plan    Diagnoses and all orders for this visit:    1. Nonspecific syndrome suggestive of viral illness (Primary)  Assessment & Plan:  Exam reassuring, lung sounds clear, O2 sat 96%.  COVID, strep and influenza negative in clinic. Encouraged patient to continue supportive care, including rest, increasing fluids, Tylenol/Motrin as needed for fever/comfort, humidifier at the bedside, monitoring intake/output, Vicks VapoRub as needed. Salt water gargles, warm tea with honey, throat lozenges for sore throat. Discussed with patient that days 3-5 of viral illness are often the worst and symptoms should continue to improve. Discussed worrisome symptoms, including shortness of breath, chest pain, wheezing, or inability to keep fluids down. Educated patient on hand hygiene, masking, social distancing. Patient to continue to monitor and will call or return to clinic if symptoms worsen or persist. Patient verbalized understanding of worrisome symptoms and when to present to the Emergency Department/Urgent Care.       Orders:  -     POCT SARS-CoV-2 Antigen NIKOLAS + Flu  -     XR Chest PA & Lateral (In Office)    2. Acute cough  -     XR Chest PA & Lateral (In Office)  -     fluticasone (FLONASE) 50 MCG/ACT nasal spray; 2 sprays by Each Nare route Daily.  Dispense: 16 g; Refill: 0  -     methylPREDNISolone (MEDROL) 4 MG dose pack; Take as directed on package instructions.  Dispense: 21 tablet; Refill: 0  -     albuterol sulfate  (90 Base) MCG/ACT inhaler; Inhale 2 puffs Every 6 (Six) Hours As Needed for Wheezing.  Dispense: 18 g; Refill: 1    3. Post-nasal drip  -     fluticasone (FLONASE) 50 MCG/ACT nasal spray; 2 sprays by Each Nare route Daily.  Dispense: 16 g; Refill: 0  -     methylPREDNISolone (MEDROL)  4 MG dose pack; Take as directed on package instructions.  Dispense: 21 tablet; Refill: 0    4. Sore throat  Comments:  Strep negative.  Recommended Flonase to help with postnasal drip.  Recommended lozenges and increasing fluids  Orders:  -     POCT rapid strep A            Follow Up   No follow-ups on file.  Patient was given instructions and counseling regarding her condition or for health maintenance advice. Please see specific information pulled into the AVS if appropriate.

## 2023-01-10 NOTE — ASSESSMENT & PLAN NOTE
Exam reassuring, lung sounds clear, O2 sat 96%.  COVID, strep and influenza negative in clinic. Encouraged patient to continue supportive care, including rest, increasing fluids, Tylenol/Motrin as needed for fever/comfort, humidifier at the bedside, monitoring intake/output, Vicks VapoRub as needed. Salt water gargles, warm tea with honey, throat lozenges for sore throat. Discussed with patient that days 3-5 of viral illness are often the worst and symptoms should continue to improve. Discussed worrisome symptoms, including shortness of breath, chest pain, wheezing, or inability to keep fluids down. Educated patient on hand hygiene, masking, social distancing. Patient to continue to monitor and will call or return to clinic if symptoms worsen or persist. Patient verbalized understanding of worrisome symptoms and when to present to the Emergency Department/Urgent Care.

## 2023-01-30 DIAGNOSIS — K21.9 GASTROESOPHAGEAL REFLUX DISEASE, UNSPECIFIED WHETHER ESOPHAGITIS PRESENT: ICD-10-CM

## 2023-01-30 DIAGNOSIS — E87.6 HYPOKALEMIA: ICD-10-CM

## 2023-01-30 RX ORDER — FAMOTIDINE 20 MG/1
TABLET, FILM COATED ORAL
Qty: 30 TABLET | Refills: 5 | Status: SHIPPED | OUTPATIENT
Start: 2023-01-30 | End: 2023-02-07

## 2023-01-30 RX ORDER — POTASSIUM CHLORIDE 600 MG/1
TABLET, FILM COATED, EXTENDED RELEASE ORAL
Qty: 30 TABLET | Refills: 5 | Status: SHIPPED | OUTPATIENT
Start: 2023-01-30 | End: 2023-02-17

## 2023-02-07 ENCOUNTER — OFFICE VISIT (OUTPATIENT)
Dept: FAMILY MEDICINE CLINIC | Facility: CLINIC | Age: 48
End: 2023-02-07
Payer: MEDICAID

## 2023-02-07 VITALS
BODY MASS INDEX: 20.82 KG/M2 | OXYGEN SATURATION: 98 % | TEMPERATURE: 98.1 F | HEART RATE: 75 BPM | WEIGHT: 129 LBS | DIASTOLIC BLOOD PRESSURE: 60 MMHG | SYSTOLIC BLOOD PRESSURE: 100 MMHG

## 2023-02-07 DIAGNOSIS — M54.50 ACUTE BILATERAL LOW BACK PAIN WITHOUT SCIATICA: ICD-10-CM

## 2023-02-07 DIAGNOSIS — R11.2 NAUSEA AND VOMITING, UNSPECIFIED VOMITING TYPE: ICD-10-CM

## 2023-02-07 DIAGNOSIS — J02.9 PHARYNGITIS, UNSPECIFIED ETIOLOGY: ICD-10-CM

## 2023-02-07 DIAGNOSIS — J01.00 ACUTE NON-RECURRENT MAXILLARY SINUSITIS: ICD-10-CM

## 2023-02-07 DIAGNOSIS — K86.1 CHRONIC PANCREATITIS, UNSPECIFIED PANCREATITIS TYPE: Primary | ICD-10-CM

## 2023-02-07 LAB
BASOPHILS # BLD AUTO: 0.1 10*3/MM3 (ref 0–0.2)
BASOPHILS NFR BLD AUTO: 1.3 % (ref 0–1.5)
DEPRECATED RDW RBC AUTO: 44.1 FL (ref 37–54)
EOSINOPHIL # BLD AUTO: 0.5 10*3/MM3 (ref 0–0.4)
EOSINOPHIL NFR BLD AUTO: 6.6 % (ref 0.3–6.2)
ERYTHROCYTE [DISTWIDTH] IN BLOOD BY AUTOMATED COUNT: 14.1 % (ref 12.3–15.4)
EXPIRATION DATE: NORMAL
FLUAV AG UPPER RESP QL IA.RAPID: NOT DETECTED
FLUBV AG UPPER RESP QL IA.RAPID: NOT DETECTED
HCT VFR BLD AUTO: 43.8 % (ref 34–46.6)
HGB BLD-MCNC: 14.5 G/DL (ref 12–15.9)
IMM GRANULOCYTES # BLD AUTO: 0.02 10*3/MM3 (ref 0–0.05)
IMM GRANULOCYTES NFR BLD AUTO: 0.3 % (ref 0–0.5)
INTERNAL CONTROL: NORMAL
LYMPHOCYTES # BLD AUTO: 2.89 10*3/MM3 (ref 0.7–3.1)
LYMPHOCYTES NFR BLD AUTO: 38.2 % (ref 19.6–45.3)
Lab: NORMAL
MCH RBC QN AUTO: 29.1 PG (ref 26.6–33)
MCHC RBC AUTO-ENTMCNC: 33.1 G/DL (ref 31.5–35.7)
MCV RBC AUTO: 87.8 FL (ref 79–97)
MONOCYTES # BLD AUTO: 0.54 10*3/MM3 (ref 0.1–0.9)
MONOCYTES NFR BLD AUTO: 7.1 % (ref 5–12)
NEUTROPHILS NFR BLD AUTO: 3.52 10*3/MM3 (ref 1.7–7)
NEUTROPHILS NFR BLD AUTO: 46.5 % (ref 42.7–76)
NRBC BLD AUTO-RTO: 0 /100 WBC (ref 0–0.2)
PLATELET # BLD AUTO: 348 10*3/MM3 (ref 140–450)
PMV BLD AUTO: 10.8 FL (ref 6–12)
RBC # BLD AUTO: 4.99 10*6/MM3 (ref 3.77–5.28)
SARS-COV-2 AG UPPER RESP QL IA.RAPID: NOT DETECTED
WBC NRBC COR # BLD: 7.57 10*3/MM3 (ref 3.4–10.8)

## 2023-02-07 PROCEDURE — 82150 ASSAY OF AMYLASE: CPT | Performed by: FAMILY MEDICINE

## 2023-02-07 PROCEDURE — 85025 COMPLETE CBC W/AUTO DIFF WBC: CPT | Performed by: FAMILY MEDICINE

## 2023-02-07 PROCEDURE — 80053 COMPREHEN METABOLIC PANEL: CPT | Performed by: FAMILY MEDICINE

## 2023-02-07 PROCEDURE — 87428 SARSCOV & INF VIR A&B AG IA: CPT | Performed by: FAMILY MEDICINE

## 2023-02-07 PROCEDURE — 83690 ASSAY OF LIPASE: CPT | Performed by: FAMILY MEDICINE

## 2023-02-07 PROCEDURE — 99214 OFFICE O/P EST MOD 30 MIN: CPT | Performed by: FAMILY MEDICINE

## 2023-02-07 RX ORDER — PANTOPRAZOLE SODIUM 40 MG/1
40 TABLET, DELAYED RELEASE ORAL DAILY
Qty: 30 TABLET | Refills: 0 | Status: SHIPPED | OUTPATIENT
Start: 2023-02-07 | End: 2023-03-06

## 2023-02-07 RX ORDER — SACCHAROMYCES BOULARDII 250 MG
250 CAPSULE ORAL 2 TIMES DAILY
Qty: 20 CAPSULE | Refills: 0 | Status: SHIPPED | OUTPATIENT
Start: 2023-02-07 | End: 2023-02-17 | Stop reason: SDUPTHER

## 2023-02-07 RX ORDER — PREDNISONE 20 MG/1
40 TABLET ORAL DAILY
Qty: 10 TABLET | Refills: 0 | Status: SHIPPED | OUTPATIENT
Start: 2023-02-07 | End: 2023-02-12

## 2023-02-07 RX ORDER — CEFDINIR 300 MG/1
300 CAPSULE ORAL 2 TIMES DAILY
Qty: 14 CAPSULE | Refills: 0 | Status: SHIPPED | OUTPATIENT
Start: 2023-02-07 | End: 2023-02-14

## 2023-02-07 RX ORDER — ONDANSETRON 4 MG/1
4 TABLET, FILM COATED ORAL 4 TIMES DAILY PRN
Qty: 28 TABLET | Refills: 0 | Status: SHIPPED | OUTPATIENT
Start: 2023-02-07

## 2023-02-07 NOTE — PROGRESS NOTES
Venipuncture Blood Specimen Collection  Venipuncture performed in left arm by Velma Claros with good hemostasis. Patient tolerated the procedure well without complications.   02/07/23   Velma Claros

## 2023-02-07 NOTE — PROGRESS NOTES
Chief Complaint  Sinusitis (Sinus trouble )    Yevgeniy Neumann presents to [facility]  History of Present Illness  Pt has had intermittent vomiting and nausea and GERD x 2 weeks- sudden onset- symptoms unchanged    Pt has h/o chronic pancreatitis- needs referral to see GI  Sinusitis  This is a new problem. The current episode started more than 1 month ago. The problem is unchanged. There has been no fever. Associated symptoms include congestion, coughing, sinus pressure and a sore throat. Pertinent negatives include no chills, diaphoresis, ear pain, headaches, hoarse voice, neck pain, shortness of breath, sneezing or swollen glands. Past treatments include nothing.       Objective   No Known Allergies    There is no immunization history on file for this patient.  Past Medical History:   Diagnosis Date   • Chronic pancreatitis (HCC)    • COPD (chronic obstructive pulmonary disease) (HCC)       Past Surgical History:   Procedure Laterality Date   •  SECTION      x2   • CHOLECYSTECTOMY     • D & C WITH SUCTION      x2   • PANCREAS SURGERY     • TUBAL ABDOMINAL LIGATION        Social History     Socioeconomic History   • Marital status:    Tobacco Use   • Smoking status: Every Day   • Smokeless tobacco: Never   Vaping Use   • Vaping Use: Never used   Substance and Sexual Activity   • Alcohol use: Never   • Drug use: Never   • Sexual activity: Never     Birth control/protection: None        Current Outpatient Medications:   •  albuterol (ACCUNEB) 1.25 MG/3ML nebulizer solution, Take 3 mL by nebulization Every 6 (Six) Hours As Needed for Wheezing., Disp: 360 mL, Rfl: 5  •  albuterol sulfate  (90 Base) MCG/ACT inhaler, Inhale 2 puffs Every 6 (Six) Hours As Needed for Wheezing., Disp: 18 g, Rfl: 1  •  buprenorphine-naloxone (SUBOXONE) 8-2 MG per SL tablet, , Disp: , Rfl:   •  Creon 44436-89790 units capsule delayed-release particles capsule, , Disp: ,  Rfl:   •  fluticasone (FLONASE) 50 MCG/ACT nasal spray, 2 sprays by Each Nare route Daily., Disp: 16 g, Rfl: 0  •  gabapentin (NEURONTIN) 100 MG capsule, , Disp: , Rfl:   •  gabapentin (NEURONTIN) 300 MG capsule, Take 300 mg by mouth 3 (Three) Times a Day., Disp: , Rfl:   •  gabapentin (NEURONTIN) 800 MG tablet, Take 800 mg by mouth 2 (Two) Times a Day., Disp: , Rfl:   •  meclizine (ANTIVERT) 25 MG tablet, Take 1 tablet by mouth 3 (Three) Times a Day As Needed for Dizziness., Disp: 21 tablet, Rfl: 0  •  multivitamin (multivitamin) tablet tablet, Take 1 tablet by mouth Daily., Disp: 30 tablet, Rfl: 1  •  potassium chloride (KLOR-CON) 8 MEQ CR tablet, TAKE ONE TABLET BY MOUTH DAILY, Disp: 30 tablet, Rfl: 5  •  potassium chloride ER (K-TAB) 20 MEQ tablet controlled-release ER tablet, , Disp: , Rfl:   •  QUEtiapine (SEROquel) 25 MG tablet, Take 25 mg by mouth every night at bedtime., Disp: , Rfl:   •  Symbicort 160-4.5 MCG/ACT inhaler, INHALE TWO PUFFS BY MOUTH TWICE A DAY, Disp: 10.2 g, Rfl: 5  •  valACYclovir (VALTREX) 1000 MG tablet, Take 1,000 mg by mouth 2 (Two) Times a Day., Disp: , Rfl:   •  cefdinir (OMNICEF) 300 MG capsule, Take 1 capsule by mouth 2 (Two) Times a Day for 7 days., Disp: 14 capsule, Rfl: 0  •  ondansetron (Zofran) 4 MG tablet, Take 1 tablet by mouth 4 (Four) Times a Day As Needed for Nausea or Vomiting., Disp: 28 tablet, Rfl: 0  •  pantoprazole (Protonix) 40 MG EC tablet, Take 1 tablet by mouth Daily., Disp: 30 tablet, Rfl: 0  •  predniSONE (DELTASONE) 20 MG tablet, Take 2 tablets by mouth Daily for 5 days., Disp: 10 tablet, Rfl: 0  •  saccharomyces boulardii (Florastor) 250 MG capsule, Take 1 capsule by mouth 2 (Two) Times a Day for 10 days., Disp: 20 capsule, Rfl: 0   Family History   Problem Relation Age of Onset   • Diabetes Mother    • No Known Problems Father           Vital Signs:   Vitals:    02/07/23 1347   BP: 100/60   Pulse: 75   Temp: 98.1 °F (36.7 °C)   SpO2: 98%   Weight: 58.5 kg  (129 lb)       Review of Systems   Constitutional: Negative for chills and diaphoresis.   HENT: Positive for congestion, sinus pressure and sore throat. Negative for ear pain, hoarse voice and sneezing.    Respiratory: Positive for cough. Negative for shortness of breath.    Musculoskeletal: Negative for neck pain.   Neurological: Negative for headaches.      Physical Exam  Vitals reviewed.   Constitutional:       Appearance: Normal appearance. She is well-developed.   HENT:      Head: Normocephalic and atraumatic.      Comments: Bilateral maxillary sinus tenderness     Right Ear: Tympanic membrane, ear canal and external ear normal.      Left Ear: Tympanic membrane, ear canal and external ear normal.      Nose: Nose normal.      Mouth/Throat:      Mouth: Mucous membranes are moist.      Pharynx: Oropharynx is clear. Posterior oropharyngeal erythema present. No oropharyngeal exudate.   Eyes:      Conjunctiva/sclera: Conjunctivae normal.      Pupils: Pupils are equal, round, and reactive to light.   Cardiovascular:      Rate and Rhythm: Normal rate and regular rhythm.      Pulses: Normal pulses.      Heart sounds: Normal heart sounds. No murmur heard.    No friction rub. No gallop.   Pulmonary:      Effort: Pulmonary effort is normal.      Breath sounds: Normal breath sounds. No wheezing or rhonchi.   Abdominal:      General: Abdomen is flat. Bowel sounds are normal. There is no distension.      Palpations: Abdomen is soft. There is no mass.      Tenderness: There is no abdominal tenderness. There is no guarding or rebound.      Hernia: No hernia is present.   Musculoskeletal:         General: Normal range of motion.      Cervical back: Normal range of motion and neck supple.   Skin:     General: Skin is warm and dry.      Capillary Refill: Capillary refill takes less than 2 seconds.   Neurological:      General: No focal deficit present.      Mental Status: She is alert and oriented to person, place, and time.       Cranial Nerves: No cranial nerve deficit.   Psychiatric:         Mood and Affect: Mood and affect normal.         Behavior: Behavior normal.         Thought Content: Thought content normal.         Judgment: Judgment normal.        Result Review :                 Assessment and Plan    Diagnoses and all orders for this visit:    1. Chronic pancreatitis, unspecified pancreatitis type (HCC) (Primary)  -     Ambulatory Referral to Gastroenterology    2. Nausea and vomiting, unspecified vomiting type  -     Amylase  -     Lipase  -     CBC Auto Differential  -     Comprehensive Metabolic Panel  -     pantoprazole (Protonix) 40 MG EC tablet; Take 1 tablet by mouth Daily.  Dispense: 30 tablet; Refill: 0  -     ondansetron (Zofran) 4 MG tablet; Take 1 tablet by mouth 4 (Four) Times a Day As Needed for Nausea or Vomiting.  Dispense: 28 tablet; Refill: 0    3. Pharyngitis, unspecified etiology  -     POCT SARS-CoV-2 Antigen NIKOLAS + Flu    4. Acute non-recurrent maxillary sinusitis  -     POCT SARS-CoV-2 Antigen NIKOLAS + Flu  -     cefdinir (OMNICEF) 300 MG capsule; Take 1 capsule by mouth 2 (Two) Times a Day for 7 days.  Dispense: 14 capsule; Refill: 0  -     saccharomyces boulardii (Florastor) 250 MG capsule; Take 1 capsule by mouth 2 (Two) Times a Day for 10 days.  Dispense: 20 capsule; Refill: 0  -     predniSONE (DELTASONE) 20 MG tablet; Take 2 tablets by mouth Daily for 5 days.  Dispense: 10 tablet; Refill: 0    5. Acute bilateral low back pain without sciatica  -     Ambulatory Referral to Physical Therapy            Follow Up   Return in about 1 week (around 2/14/2023) for Recheck.  Patient was given instructions and counseling regarding her condition or for health maintenance advice. Please see specific information pulled into the AVS if appropriate.

## 2023-02-08 LAB
ALBUMIN SERPL-MCNC: 3.8 G/DL (ref 3.5–5.2)
ALBUMIN/GLOB SERPL: 1.3 G/DL
ALP SERPL-CCNC: 138 U/L (ref 39–117)
ALT SERPL W P-5'-P-CCNC: 11 U/L (ref 1–33)
AMYLASE SERPL-CCNC: 43 U/L (ref 28–100)
ANION GAP SERPL CALCULATED.3IONS-SCNC: 10.4 MMOL/L (ref 5–15)
AST SERPL-CCNC: 20 U/L (ref 1–32)
BILIRUB SERPL-MCNC: 0.2 MG/DL (ref 0–1.2)
BUN SERPL-MCNC: 9 MG/DL (ref 6–20)
BUN/CREAT SERPL: 10.6 (ref 7–25)
CALCIUM SPEC-SCNC: 9.5 MG/DL (ref 8.6–10.5)
CHLORIDE SERPL-SCNC: 98 MMOL/L (ref 98–107)
CO2 SERPL-SCNC: 30.6 MMOL/L (ref 22–29)
CREAT SERPL-MCNC: 0.85 MG/DL (ref 0.57–1)
EGFRCR SERPLBLD CKD-EPI 2021: 86.2 ML/MIN/1.73
GLOBULIN UR ELPH-MCNC: 3 GM/DL
GLUCOSE SERPL-MCNC: 99 MG/DL (ref 65–99)
LIPASE SERPL-CCNC: 14 U/L (ref 13–60)
POTASSIUM SERPL-SCNC: 3.4 MMOL/L (ref 3.5–5.2)
PROT SERPL-MCNC: 6.8 G/DL (ref 6–8.5)
SODIUM SERPL-SCNC: 139 MMOL/L (ref 136–145)

## 2023-02-10 NOTE — PROGRESS NOTES
Potassium is only slightly low.  Drink plenty of water and eat some bananas or take oral potassium.  Recheck in 1-2 weeks.

## 2023-02-14 ENCOUNTER — TELEPHONE (OUTPATIENT)
Dept: FAMILY MEDICINE CLINIC | Facility: CLINIC | Age: 48
End: 2023-02-14

## 2023-02-14 NOTE — TELEPHONE ENCOUNTER
Caller: Magi Neumann    Relationship: Self    Best call back number: [phone]     What medication are you requesting: DIFFERENT ANTIBIOTICS    What are your current symptoms: HEADS STOPPED AND SINUS INFECTION    How long have you been experiencing symptoms: 30 DAYS    Have you had these symptoms before:    [] Yes  [x] No    Have you been treated for these symptoms before:   [x] Yes  [x] No    If a prescription is needed, what is your preferred pharmacy and phone number:      Additional notes: PATIENT STATES SHE IS TAKEN ALMOST ALL OF THE CURRENT MEDICATIONS AND HAS NO RELIEF.     [facility] [id number] [address] - [phone]  - [phone]

## 2023-02-17 ENCOUNTER — TELEMEDICINE (OUTPATIENT)
Dept: FAMILY MEDICINE CLINIC | Facility: CLINIC | Age: 48
End: 2023-02-17
Payer: MEDICAID

## 2023-02-17 DIAGNOSIS — E87.6 HYPOKALEMIA: ICD-10-CM

## 2023-02-17 DIAGNOSIS — J01.00 ACUTE NON-RECURRENT MAXILLARY SINUSITIS: Primary | ICD-10-CM

## 2023-02-17 PROCEDURE — 99213 OFFICE O/P EST LOW 20 MIN: CPT | Performed by: NURSE PRACTITIONER

## 2023-02-17 RX ORDER — SACCHAROMYCES BOULARDII 250 MG
250 CAPSULE ORAL 2 TIMES DAILY
Qty: 28 CAPSULE | Refills: 0 | Status: SHIPPED | OUTPATIENT
Start: 2023-02-17 | End: 2023-03-03

## 2023-02-17 RX ORDER — DOXYCYCLINE HYCLATE 100 MG/1
100 CAPSULE ORAL 2 TIMES DAILY
Qty: 20 CAPSULE | Refills: 0 | Status: SHIPPED | OUTPATIENT
Start: 2023-02-17

## 2023-02-17 RX ORDER — POTASSIUM CHLORIDE 750 MG/1
10 TABLET, FILM COATED, EXTENDED RELEASE ORAL DAILY
Qty: 90 TABLET | Refills: 1 | Status: SHIPPED | OUTPATIENT
Start: 2023-02-17

## 2023-02-17 NOTE — PROGRESS NOTES
Chief Complaint  Headache (Head congestions, head feels full of infection and mucus), Nasal Congestion, and Vomiting (Vomiting up acid for weeks, can not hold anything down.)    Subjective        Past Medical History:   Diagnosis Date   • Chronic pancreatitis ([facility])    • COPD (chronic obstructive pulmonary disease) ([facility])    • GERD (gastroesophageal reflux disease)      Social History     Tobacco Use   • Smoking status: Every Day   • Smokeless tobacco: Never   Vaping Use   • Vaping Use: Never used   Substance Use Topics   • Alcohol use: Never   • Drug use: Never      Current Outpatient Medications on File Prior to Visit   Medication Sig   • albuterol sulfate  (90 Base) MCG/ACT inhaler Inhale 2 puffs Every 6 (Six) Hours As Needed for Wheezing.   • buprenorphine-naloxone (SUBOXONE) 8-2 MG per SL tablet    • Creon 14751-91011 units capsule delayed-release particles capsule    • fluticasone (FLONASE) 50 MCG/ACT nasal spray 2 sprays by Each Nare route Daily.   • gabapentin (NEURONTIN) 800 MG tablet Take 800 mg by mouth 2 (Two) Times a Day.   • multivitamin (multivitamin) tablet tablet Take 1 tablet by mouth Daily.   • ondansetron (Zofran) 4 MG tablet Take 1 tablet by mouth 4 (Four) Times a Day As Needed for Nausea or Vomiting.   • pantoprazole (Protonix) 40 MG EC tablet Take 1 tablet by mouth Daily.   • QUEtiapine (SEROquel) 25 MG tablet Take 25 mg by mouth every night at bedtime.   • Symbicort 160-4.5 MCG/ACT inhaler INHALE TWO PUFFS BY MOUTH TWICE A DAY   • valACYclovir (VALTREX) 1000 MG tablet Take 1,000 mg by mouth 2 (Two) Times a Day.   • [DISCONTINUED] potassium chloride (KLOR-CON) 8 MEQ CR tablet TAKE ONE TABLET BY MOUTH DAILY   • albuterol (ACCUNEB) 1.25 MG/3ML nebulizer solution Take 3 mL by nebulization Every 6 (Six) Hours As Needed for Wheezing.   • gabapentin (NEURONTIN) 100 MG capsule    • gabapentin (NEURONTIN) 300 MG capsule Take 300 mg by mouth 3 (Three) Times a Day.   • meclizine (ANTIVERT) 25 MG  tablet Take 1 tablet by mouth 3 (Three) Times a Day As Needed for Dizziness.   • [DISCONTINUED] potassium chloride ER (K-TAB) 20 MEQ tablet controlled-release ER tablet    • [DISCONTINUED] saccharomyces boulardii (Florastor) 250 MG capsule Take 1 capsule by mouth 2 (Two) Times a Day for 10 days.     No current facility-administered medications on file prior to visit.      No Known Allergies   Health Maintenance Due   Topic Date Due   • COLORECTAL CANCER SCREENING  Never done   • COVID-19 Vaccine (1) Never done   • Pneumococcal Vaccine 0-64 (1 - PCV) Never done   • TDAP/TD VACCINES (1 - Tdap) Never done   • ANNUAL PHYSICAL  Never done   • INFLUENZA VACCINE  Never done      Magi Neumann presents to [facility]  History of Present Illness  This was an audio and video enabled telemedicine encounter. Pt is still having abdominal and sinus issues. Pt is currently in her home and no others present in the room at the time of visit.     Sinusitis: continues to have congestion and drainage, pressure, shooting pain into the eyes, ears popping. Denies sore throat. Pt completed a steroid and cefdinir without improvement in symptoms. Pt is not currently on any antihistamines.     Chronic pancreatitis: every time she eats she will get sick and vomit green acid. States similar to when she had acute pancreatitis but denies pain. Has an appointment [facility] in July.  Pt is also having chest tightness.         Objective   Vital Signs:   There were no vitals taken for this visit.    Review of Systems   Physical Exam  Vitals reviewed.   Constitutional:       Appearance: Normal appearance. She is well-developed.   Pulmonary:      Effort: Pulmonary effort is normal.   Neurological:      Mental Status: She is alert and oriented to person, place, and time.   Psychiatric:         Mood and Affect: Mood and affect normal.         Behavior: Behavior normal.         Thought Content: Thought content normal.          Judgment: Judgment normal.        Result Review :   The following data was reviewed by: YULI Mahoney on 02/17/2023:  Common labs    Common Labs 8/2/22 8/2/22 10/6/22 10/6/22 2/7/23 2/7/23    1733 1733 1041 1041 1455 1455   Glucose  83  98  99   BUN  9  10  9   Creatinine  0.71  0.83  0.85   Sodium  137  136  139   Potassium  4.2  2.8 (A)  3.4 (A)   Chloride  102  94 (A)  98   Calcium  9.2  9.5  9.5   Albumin  3.70  4.20  3.8   Total Bilirubin  0.2  0.3  0.2   Alkaline Phosphatase  112  144 (A)  138 (A)   AST (SGOT)  15  16  20   ALT (SGPT)  11  7  11   WBC 7.65  13.56 (A)  7.57    Hemoglobin 13.2  13.6  14.5    Hematocrit 39.2  41.2  43.8    Platelets 350  374  348    (A) Abnormal value            TSH    TSH 2/24/22 5/19/22   TSH 1.290 2.240                     Assessment and Plan    Diagnoses and all orders for this visit:    1. Acute non-recurrent maxillary sinusitis (Primary)  -     doxycycline (VIBRAMYCIN) 100 MG capsule; Take 1 capsule by mouth 2 (Two) Times a Day.  Dispense: 20 capsule; Refill: 0  -     saccharomyces boulardii (Florastor) 250 MG capsule; Take 1 capsule by mouth 2 (Two) Times a Day for 14 days.  Dispense: 28 capsule; Refill: 0    2. Hypokalemia  -     potassium chloride 10 MEQ CR tablet; Take 1 tablet by mouth Daily.  Dispense: 90 tablet; Refill: 1  -     Comprehensive Metabolic Panel; Future    Patient to follow-up in 1 to 2 weeks to recheck potassium level, if potassium still low or borderline low will increase to 20 mill equivalents once daily as patient was previously on 20 mill equivalents twice daily.  Patient has a history of IV potassium replacement.    We will start patient on doxycycline and patient to take probiotic.    Follow Up   Return if symptoms worsen or fail to improve.  Patient was given instructions and counseling regarding her condition or for health maintenance advice. Please see specific information pulled into the AVS if appropriate.

## 2023-03-06 DIAGNOSIS — R11.2 NAUSEA AND VOMITING, UNSPECIFIED VOMITING TYPE: ICD-10-CM

## 2023-03-06 RX ORDER — PANTOPRAZOLE SODIUM 40 MG/1
TABLET, DELAYED RELEASE ORAL
Qty: 30 TABLET | Refills: 0 | Status: SHIPPED | OUTPATIENT
Start: 2023-03-06

## 2023-05-30 NOTE — TELEPHONE ENCOUNTER
Caller: Magi Neumann    Relationship: Self    Best call back number: [phone]    Requested Prescriptions:   Requested Prescriptions     Pending Prescriptions Disp Refills   • potassium chloride (KLOR-CON) 8 MEQ CR tablet 30 tablet 0     Sig: Take 1 tablet by mouth Daily.        Pharmacy where request should be sent: [facility] - [phone]  - [phone] FX     Additional details provided by patient: PATIENT IS COMPLETELY OUT OF THIS MEDICATION. SHE HAD POSSIBLE HEART ATTACK AND WAS SEEN FOR THIS AT [facility]. POTASSIUM LEVEL WAS AT UNDER 3. PLEASE CALL SCRIPT INTO PHARMACY ASAP.    Does the patient have less than a 3 day supply:  [x] Yes  [] No    Bo Godfrey Rep   05/18/22 08:12 EDT          Admission

## 2023-08-18 ENCOUNTER — TRANSCRIBE ORDERS (OUTPATIENT)
Dept: ADMINISTRATIVE | Facility: HOSPITAL | Age: 48
End: 2023-08-18
Payer: MEDICAID

## 2023-08-18 DIAGNOSIS — Z12.31 SCREENING MAMMOGRAM FOR HIGH-RISK PATIENT: Primary | ICD-10-CM

## 2023-08-30 ENCOUNTER — TRANSCRIBE ORDERS (OUTPATIENT)
Dept: ADMINISTRATIVE | Facility: HOSPITAL | Age: 48
End: 2023-08-30
Payer: MEDICAID

## 2023-08-30 DIAGNOSIS — N63.20 MASS OF LEFT BREAST, UNSPECIFIED QUADRANT: Primary | ICD-10-CM

## 2023-12-01 ENCOUNTER — LAB (OUTPATIENT)
Dept: LAB | Facility: HOSPITAL | Age: 48
End: 2023-12-01
Payer: MEDICAID

## 2023-12-01 ENCOUNTER — TRANSCRIBE ORDERS (OUTPATIENT)
Dept: ADMINISTRATIVE | Facility: HOSPITAL | Age: 48
End: 2023-12-01
Payer: MEDICAID

## 2023-12-01 ENCOUNTER — HOSPITAL ENCOUNTER (OUTPATIENT)
Dept: GENERAL RADIOLOGY | Facility: HOSPITAL | Age: 48
Discharge: HOME | End: 2023-12-01
Payer: MEDICAID

## 2023-12-01 ENCOUNTER — HOSPITAL ENCOUNTER (OUTPATIENT)
Dept: CARDIOLOGY | Facility: HOSPITAL | Age: 48
Discharge: HOME | End: 2023-12-01
Payer: MEDICAID

## 2023-12-01 DIAGNOSIS — R07.9 CHEST PAIN, UNSPECIFIED TYPE: ICD-10-CM

## 2023-12-01 DIAGNOSIS — R26.2 DIFFICULTY IN WALKING: ICD-10-CM

## 2023-12-01 DIAGNOSIS — S92.352A DISP FX OF FIFTH METATARSAL BONE, LEFT FOOT, INIT: ICD-10-CM

## 2023-12-01 DIAGNOSIS — R26.2 DIFFICULTY IN WALKING: Primary | ICD-10-CM

## 2023-12-01 LAB
25(OH)D3 SERPL-MCNC: 18.7 NG/ML (ref 30–100)
AMPHET+METHAMPHET UR QL: NEGATIVE
ANION GAP SERPL CALCULATED.3IONS-SCNC: 12.4 MMOL/L (ref 5–15)
BARBITURATES UR QL SCN: NEGATIVE
BASOPHILS # BLD AUTO: 0.05 10*3/MM3 (ref 0–0.2)
BASOPHILS NFR BLD AUTO: 0.8 % (ref 0–1.5)
BENZODIAZ UR QL SCN: NEGATIVE
BUN SERPL-MCNC: 10 MG/DL (ref 6–20)
BUN/CREAT SERPL: 13.2 (ref 7–25)
CALCIUM SPEC-SCNC: 9.3 MG/DL (ref 8.6–10.5)
CANNABINOIDS SERPL QL: NEGATIVE
CHLORIDE SERPL-SCNC: 95 MMOL/L (ref 98–107)
CO2 SERPL-SCNC: 30.6 MMOL/L (ref 22–29)
COCAINE UR QL: NEGATIVE
CREAT SERPL-MCNC: 0.76 MG/DL (ref 0.57–1)
DEPRECATED RDW RBC AUTO: 42.4 FL (ref 37–54)
EGFRCR SERPLBLD CKD-EPI 2021: 98 ML/MIN/1.73
EOSINOPHIL # BLD AUTO: 0.23 10*3/MM3 (ref 0–0.4)
EOSINOPHIL NFR BLD AUTO: 3.5 % (ref 0.3–6.2)
ERYTHROCYTE [DISTWIDTH] IN BLOOD BY AUTOMATED COUNT: 13.5 % (ref 12.3–15.4)
FENTANYL UR-MCNC: NEGATIVE NG/ML
GLUCOSE SERPL-MCNC: 95 MG/DL (ref 65–99)
HCT VFR BLD AUTO: 39.1 % (ref 34–46.6)
HGB BLD-MCNC: 12.9 G/DL (ref 12–15.9)
IMM GRANULOCYTES # BLD AUTO: 0.01 10*3/MM3 (ref 0–0.05)
IMM GRANULOCYTES NFR BLD AUTO: 0.2 % (ref 0–0.5)
LYMPHOCYTES # BLD AUTO: 1.69 10*3/MM3 (ref 0.7–3.1)
LYMPHOCYTES NFR BLD AUTO: 25.4 % (ref 19.6–45.3)
MCH RBC QN AUTO: 28.7 PG (ref 26.6–33)
MCHC RBC AUTO-ENTMCNC: 33 G/DL (ref 31.5–35.7)
MCV RBC AUTO: 86.9 FL (ref 79–97)
METHADONE UR QL SCN: NEGATIVE
MONOCYTES # BLD AUTO: 0.58 10*3/MM3 (ref 0.1–0.9)
MONOCYTES NFR BLD AUTO: 8.7 % (ref 5–12)
NEUTROPHILS NFR BLD AUTO: 4.1 10*3/MM3 (ref 1.7–7)
NEUTROPHILS NFR BLD AUTO: 61.4 % (ref 42.7–76)
NRBC BLD AUTO-RTO: 0 /100 WBC (ref 0–0.2)
OPIATES UR QL: NEGATIVE
OXYCODONE UR QL SCN: NEGATIVE
PLATELET # BLD AUTO: 314 10*3/MM3 (ref 140–450)
PMV BLD AUTO: 10.6 FL (ref 6–12)
POTASSIUM SERPL-SCNC: 3.2 MMOL/L (ref 3.5–5.2)
QT INTERVAL: 394 MS
QTC INTERVAL: 450 MS
RBC # BLD AUTO: 4.5 10*6/MM3 (ref 3.77–5.28)
SODIUM SERPL-SCNC: 138 MMOL/L (ref 136–145)
WBC NRBC COR # BLD AUTO: 6.66 10*3/MM3 (ref 3.4–10.8)

## 2023-12-01 PROCEDURE — 80307 DRUG TEST PRSMV CHEM ANLYZR: CPT

## 2023-12-01 PROCEDURE — 82306 VITAMIN D 25 HYDROXY: CPT

## 2023-12-01 PROCEDURE — 93005 ELECTROCARDIOGRAM TRACING: CPT | Performed by: PODIATRIST

## 2023-12-01 PROCEDURE — 85025 COMPLETE CBC W/AUTO DIFF WBC: CPT

## 2023-12-01 PROCEDURE — 36415 COLL VENOUS BLD VENIPUNCTURE: CPT

## 2023-12-01 PROCEDURE — 80048 BASIC METABOLIC PNL TOTAL CA: CPT

## 2023-12-01 PROCEDURE — 71046 X-RAY EXAM CHEST 2 VIEWS: CPT

## 2023-12-10 LAB
QT INTERVAL: 394 MS
QTC INTERVAL: 450 MS

## 2025-01-27 ENCOUNTER — TRANSCRIBE ORDERS (OUTPATIENT)
Dept: ADMINISTRATIVE | Facility: HOSPITAL | Age: 50
End: 2025-01-27
Payer: COMMERCIAL

## 2025-01-27 ENCOUNTER — HOSPITAL ENCOUNTER (OUTPATIENT)
Dept: GENERAL RADIOLOGY | Facility: HOSPITAL | Age: 50
Discharge: HOME | End: 2025-01-27
Admitting: FAMILY MEDICINE
Payer: COMMERCIAL

## 2025-01-27 DIAGNOSIS — M47.814 THORACIC SPONDYLOSIS WITHOUT MYELOPATHY: ICD-10-CM

## 2025-01-27 DIAGNOSIS — M47.812 CERVICAL SPONDYLOSIS WITHOUT MYELOPATHY: Primary | ICD-10-CM

## 2025-01-27 DIAGNOSIS — M47.812 CERVICAL SPONDYLOSIS WITHOUT MYELOPATHY: ICD-10-CM

## 2025-01-27 PROCEDURE — 72070 X-RAY EXAM THORAC SPINE 2VWS: CPT

## 2025-01-27 PROCEDURE — 72040 X-RAY EXAM NECK SPINE 2-3 VW: CPT

## 2025-02-06 ENCOUNTER — OFFICE VISIT (OUTPATIENT)
Age: 50
End: 2025-02-06
Payer: COMMERCIAL

## 2025-02-06 VITALS
HEART RATE: 84 BPM | DIASTOLIC BLOOD PRESSURE: 79 MMHG | WEIGHT: 122 LBS | BODY MASS INDEX: 20.33 KG/M2 | OXYGEN SATURATION: 97 % | SYSTOLIC BLOOD PRESSURE: 117 MMHG | HEIGHT: 65 IN

## 2025-02-06 DIAGNOSIS — M47.812 CERVICAL SPONDYLOSIS: ICD-10-CM

## 2025-02-06 DIAGNOSIS — J43.1 PANLOBULAR EMPHYSEMA: ICD-10-CM

## 2025-02-06 DIAGNOSIS — F11.21 OPIOID DEPENDENCE IN REMISSION: Primary | ICD-10-CM

## 2025-02-06 DIAGNOSIS — F17.200 NICOTINE DEPENDENCE WITH CURRENT USE: ICD-10-CM

## 2025-02-06 RX ORDER — BUPRENORPHINE 8 MG/1
16 TABLET SUBLINGUAL DAILY
Qty: 60 TABLET | Refills: 0 | Status: SHIPPED | OUTPATIENT
Start: 2025-02-06

## 2025-02-06 RX ORDER — IBUPROFEN 800 MG/1
800 TABLET, FILM COATED ORAL EVERY 8 HOURS PRN
Qty: 90 TABLET | Refills: 2 | Status: SHIPPED | OUTPATIENT
Start: 2025-02-06 | End: 2026-02-06

## 2025-02-06 NOTE — PROGRESS NOTES
Office  Note     Patient Name: Magi Neumann  : 1975   MRN: [id number]     Referring Provider: Joe Zhou MD    Chief Complaint: Substance use    History of Present Illness:   HPI    Magi Neumann is a 49 y.o. female who is here today for initial evaluation related to a substance use disorder.    History of drug use patient has an OUD for the past 20 years we have been taking care of that condition for the last 10 years.  She has been using buprenorphine compliantly over that period of time as 16 mg of buprenorphine sublingually every day.  She has had a few relapses over that period of time with nonopiates including alcohol and a benzodiazepine a few times but not in the last 5 years she is got her recovery in a pretty good place right now where she rarely has craving especially since her sister got put in a nursing home.  The sister is also afflicted with mostly a stimulant use disorder using methamphetamine and could not maintain any significant period of sobriety over the last 5 years that I have known her until she got put in a nursing home last fall at 52 years of age.    Negative consequences of drug use the patient is negative consequences of drug abuse including not being able to hold onto a job.  She had some legal charges that she had to work through though only just a few months in shelter due to drug related issues.  She did hold on her marriage but did spend a lot of money with nothing left to show for it.  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    History of drug abuse treatment patient started off in her teens and 20s experimenting with a lot of different drugs by but by mid her 20s she was using mostly opiates by mid 30s she was only using opiates and on a daily basis until we met her about 5 years later.  She has not needed any inpatient treatment  she never overdosed badly enough to need naloxone as an emergency intervention.  She had a few episodes of IV drug abuse in her 30s but never contracted HIV or any hepatitis from it.    Social History:  Social History     Socioeconomic History    Marital status:      Spouse name: CHULA    Number of children: 2    Highest education level: Some college, no degree   Tobacco Use    Smoking status: Every Day     Current packs/day: 0.50     Average packs/day: 0.5 packs/day for 32.3 years (16.1 ttl pk-yrs)     Types: Cigarettes     Start date: 10/20/1992    Smokeless tobacco: Never   Vaping Use    Vaping status: Some Days    Substances: Flavoring    Devices: Disposable   Substance and Sexual Activity    Alcohol use: Never    Drug use: Not Currently    Sexual activity: Defer     Birth control/protection: None       Social History     Social History Narrative    Social History:    Social Support:  AND SON    Residence: living setting HOME with     Current Employment: [employer]    Education: SOME COLLEGE    Learning Disabilities: NONE    Legal history: NONE    Hobbies/interests: [employer]    Holiness: Mu-ism    Exercise: SOME    Dietary issues: YES CHRONIC PANCREATITIS    Sleep issues: SOME BUT IS ON MEDS    Caffeine intake: 2-3 CANS OF SODA     history: NONE       Goals for treatment her goals for treatment initially were just to get sober and stay sober but she understands the importance of continuing to push herself to be her best version of her self even well.  Even though that is more difficult she has understood she she now understands that like the  Francesco Zabmrano said if you are not growing your decaying.  She is determined to keep moving forward rather than slipping backward.    Triggers: Current triggers are none since her sister is in a nursing home.    Cravings: Cravings are almost never and are even then only related to being physically exhausted or having her neck  pain flare up.  We just did an x-ray of her cervical spine at at the [facility].  Arthritis there we're treating now with ibuprofen 800 mg.  The arthritis that shows up on her C-spine x-ray is quite minimal so her pain there is due to poor core strength.  We recommended starting an exercise program with [employer] may be a physical therapist there.  The last major chronic disease that is in poor control really is her emphysema she is using albuterol inhaler every day and a nebulizer 2 days a week her Symbicort she is only taken once a day on surprisingly.  She did to start Stiolto 2 sprays a day 2 weeks ago and already feels like her breathing is easier.  When she has been on the 2 prophylactic inhalers for a month she should need to not use her albuterol most days of the week.    Relapse Prevention: She is going to start therapy here ASAP    UDS Confirmation: Pending    LILY (PDMP) Reviewed for Current/Active Medications      Past Surgical History:  Past Surgical History:   Procedure Laterality Date     SECTION      x2    CHOLECYSTECTOMY      D & C WITH SUCTION      x2    PANCREAS SURGERY      TUBAL ABDOMINAL LIGATION         Problem List:  Patient Active Problem List   Diagnosis    Chronic pancreatitis    Panic attack    Anxiety    Hypokalemia    Pancreatitis    Nonspecific syndrome suggestive of viral illness       Allergy:   No Known Allergies     Current Medications:   Current Outpatient Medications   Medication Sig Dispense Refill    buprenorphine (SUBUTEX) 8 MG sublingual tablet SL tablet Place 2 tablets under the tongue Daily. 60 tablet 0    acetaminophen (TYLENOL) 500 MG tablet Take 1 tablet by mouth Every 6 (Six) Hours As Needed for Mild Pain. 30 tablet 0    albuterol (ACCUNEB) 1.25 MG/3ML nebulizer solution Take 3 mL by nebulization Every 6 (Six) Hours As Needed for Wheezing. 360 mL 5    albuterol sulfate  (90 Base) MCG/ACT inhaler Inhale 2 puffs Every 6 (Six) Hours As  Needed for Wheezing. 18 g 1    Creon 76360-13669 units capsule delayed-release particles capsule       gabapentin (NEURONTIN) 800 MG tablet Take 800 mg by mouth 2 (Two) Times a Day.      ibuprofen (ADVIL,MOTRIN) 800 MG tablet Take 1 tablet by mouth Every 8 (Eight) Hours As Needed for Moderate Pain (Use with food.). 90 tablet 2    potassium chloride (K-DUR,KLOR-CON) 20 MEQ CR tablet Take 1 tablet by mouth Daily.      QUEtiapine (SEROquel) 25 MG tablet Take 25 mg by mouth every night at bedtime.      Symbicort 160-4.5 MCG/ACT inhaler INHALE TWO PUFFS BY MOUTH TWICE A DAY 10.2 g 5     No current facility-administered medications for this visit.       Past Medical History:  Past Medical History:   Diagnosis Date    Anxiety     Chronic pancreatitis     COPD (chronic obstructive pulmonary disease)     Depression     GERD (gastroesophageal reflux disease)     Substance abuse        Family History:  Family History   Problem Relation Age of Onset    Diabetes Mother     No Known Problems Father          Subjective      Review of Systems:   Review of Systems    PHQ-9 Score:       SANJAY-7 Score:   Feeling nervous, anxious or on edge: Several days  Not being able to stop or control worrying: Several days  Worrying too much about different things: Several days  Trouble Relaxing: Nearly every day  Being so restless that it is hard to sit still: Not at all  Feeling afraid as if something awful might happen: Not at all  Becoming easily annoyed or irritable: Not at all  SANJAY 7 Total Score: 6  If you checked any problems, how difficult have these problems made it for you to do your work, take care of things at home, or get along with other people: Somewhat difficult    Patient History:   The following portions of the patient's history were reviewed and updated as appropriate: allergies, current medications, past family history, past medical history, past social history, past surgical history and problem list.     Medications:     Current  "Outpatient Medications:     buprenorphine (SUBUTEX) 8 MG sublingual tablet SL tablet, Place 2 tablets under the tongue Daily., Disp: 60 tablet, Rfl: 0    acetaminophen (TYLENOL) 500 MG tablet, Take 1 tablet by mouth Every 6 (Six) Hours As Needed for Mild Pain., Disp: 30 tablet, Rfl: 0    albuterol (ACCUNEB) 1.25 MG/3ML nebulizer solution, Take 3 mL by nebulization Every 6 (Six) Hours As Needed for Wheezing., Disp: 360 mL, Rfl: 5    albuterol sulfate  (90 Base) MCG/ACT inhaler, Inhale 2 puffs Every 6 (Six) Hours As Needed for Wheezing., Disp: 18 g, Rfl: 1    Creon 34298-45014 units capsule delayed-release particles capsule, , Disp: , Rfl:     gabapentin (NEURONTIN) 800 MG tablet, Take 800 mg by mouth 2 (Two) Times a Day., Disp: , Rfl:     ibuprofen (ADVIL,MOTRIN) 800 MG tablet, Take 1 tablet by mouth Every 8 (Eight) Hours As Needed for Moderate Pain (Use with food.)., Disp: 90 tablet, Rfl: 2    potassium chloride (K-DUR,KLOR-CON) 20 MEQ CR tablet, Take 1 tablet by mouth Daily., Disp: , Rfl:     QUEtiapine (SEROquel) 25 MG tablet, Take 25 mg by mouth every night at bedtime., Disp: , Rfl:     Symbicort 160-4.5 MCG/ACT inhaler, INHALE TWO PUFFS BY MOUTH TWICE A DAY, Disp: 10.2 g, Rfl: 5    Objective     Physical Exam:  Physical Exam    Vital Signs:   Vitals:    02/06/25 1410   BP: 117/79   Pulse: 84   SpO2: 97%   Weight: 55.3 kg (122 lb)   Height: 165.1 cm (65\")       Lab Results (last 24 hours)       ** No results found for the last 24 hours. **            Pill count:  (SUBUTEX 12 PILSS)    Body mass index is 20.3 kg/m².     MENTAL STATUS EXAM   General Appearance:  Cleanly groomed and dressed  Eye Contact:  Good eye contact  Attitude:  Cooperative  Motor Activity:  Normal gait, posture  Muscle Strength:  Normal  Speech:  Normal rate, tone, volume  Language:  Spontaneous  Mood and affect:  Normal, pleasant  Hopelessness:  Denies  Loneliness: Denies  Thought Process:  Logical  Associations/ Thought Content:  No " delusions  Hallucinations:  None  Suicidal Ideations:  Not present  Homicidal Ideation:  Not present        Assessment / Plan      Diagnoses and all orders for this visit:    1. Opioid dependence in remission (Primary)  -     [facility] - Urine, Clean Catch; Future  -     buprenorphine (SUBUTEX) 8 MG sublingual tablet SL tablet; Place 2 tablets under the tongue Daily.  Dispense: 60 tablet; Refill: 0    2. Nicotine dependence with current use    3. Panlobular emphysema    4. Cervical spondylosis  -     ibuprofen (ADVIL,MOTRIN) 800 MG tablet; Take 1 tablet by mouth Every 8 (Eight) Hours As Needed for Moderate Pain (Use with food.).  Dispense: 90 tablet; Refill: 2           PLAN:  Safety: No acute safety concerns  Risk Assessment: Risk of self-harm acutely is low. Risk of self-harm chronically is also low, but could be further elevated in the event of treatment noncompliance and/or AODA.    TREATMENT PLAN/GOALS: Initiate supportive psychotherapy efforts and medications as indicated. Treatment and medication options discussed during today's visit. Patient acknowledged and verbally consented to begin the agreed upon treatment plan and was educated on the importance of compliance with treatment and follow-up appointments.  Patient has reviewed and signed their informed consent agreement and their controlled substances contract.  They have also received a copy of the controlled substances contract.    MEDICATION ISSUES:  LILY reviewed as expected.  Discussed medication options and treatment plan of prescribed medication as well as the risks, benefits, and side effects including potential falls, possible impaired driving and metabolic adversities among others. Patient is agreeable to call the office with any worsening of symptoms or onset of side effects. Patient is agreeable to call 911 or go to the nearest ER should he/she begin having SI/HI.    Return in about 1 month (around 3/6/2025)  for Start therapy..             This document has been electronically signed by Joe Zhou MD  February 6, 2025 15:20 EST      Part of this note may be an electronic transcription/translation of spoken language to printed text using the Dragon Dictation System.

## 2025-02-11 DIAGNOSIS — F11.21 OPIOID DEPENDENCE IN REMISSION: ICD-10-CM

## 2025-03-06 ENCOUNTER — OFFICE VISIT (OUTPATIENT)
Age: 50
End: 2025-03-06
Payer: COMMERCIAL

## 2025-03-06 VITALS
SYSTOLIC BLOOD PRESSURE: 104 MMHG | WEIGHT: 125 LBS | BODY MASS INDEX: 20.83 KG/M2 | OXYGEN SATURATION: 99 % | HEIGHT: 65 IN | DIASTOLIC BLOOD PRESSURE: 72 MMHG | HEART RATE: 90 BPM

## 2025-03-06 DIAGNOSIS — J43.1 PANLOBULAR EMPHYSEMA: ICD-10-CM

## 2025-03-06 DIAGNOSIS — F11.21 OPIOID DEPENDENCE IN REMISSION: Primary | ICD-10-CM

## 2025-03-06 DIAGNOSIS — M47.812 CERVICAL SPONDYLOSIS: ICD-10-CM

## 2025-03-06 RX ORDER — BUPRENORPHINE 8 MG/1
16 TABLET SUBLINGUAL DAILY
Qty: 60 TABLET | Refills: 0 | Status: SHIPPED | OUTPATIENT
Start: 2025-03-06

## 2025-03-06 NOTE — PROGRESS NOTES
Office  Note     Patient Name: Magi Neumann  : 1975   MRN: [id number]     Referring Provider: Joe Zhou MD    Chief Complaint: Substance use    History of Present Illness:   HPI    Magi Neumann is a 49 y.o. female who is here today for follow up related to her opiate use disorder.  She has not had any relapses though she is having more chronic neck pain we saw her in the family and the family practice office 2 months ago got an x-ray of her neck and it looks terrible she has a very severe arthritis all through her cervical spine though her T-spine x-ray looked remarkably normal for her age 49 years.  She tried diclofenac for a couple months last fall and that did not help at the maximum dose.  The pain is not causing any craving fortunately but that is the type of discomfort that would usually be a relapse trigger for her in the past.    She has not started any therapy here yet which were going to fix in the next month she follows back the family medicine office in 3 weeks.  Before that visit though she will start with physical therapy and will add some cyclobenzaprine to use as needed for the moderate to severe trapezius muscle spasm she has on the right side of her neck.  There is no pain or tenderness on the left side.  She does have just in the last 24 hours some pain radiating down the right arm but only to the midhumerus so it is probably not a radiculopathy from her neck but rather an issue with her shoulder itself since she woke up with back pain yesterday morning.      Triggers: Relapse triggers are mainly the pain that she is suffering with particularly since it is also markedly interfering with her sleep and she is still going to work full-time.  For example today she went to work with no breakfast no lunch and Hennis come here to the office after work looking tired and weak and run down.    Cravings: Cravings none    Relapse Prevention: Relapse prevention consist of getting started  with the therapist.  We also prescribed the Flexeril and a run with a physical therapist time 15 visits at the [facility] PT office in [place] which is close to home and work for her.    UDS Confirmation: UDS negative    LILY (PDMP) Reviewed for Current/Active Medications      Past Surgical History:  Past Surgical History:   Procedure Laterality Date     SECTION      x2    CHOLECYSTECTOMY      D & C WITH SUCTION      x2    PANCREAS SURGERY      TUBAL ABDOMINAL LIGATION         Problem List:  Patient Active Problem List   Diagnosis    Chronic pancreatitis    Panic attack    Anxiety    Hypokalemia    Pancreatitis    Nonspecific syndrome suggestive of viral illness       Allergy:   No Known Allergies     Current Medications:   Current Outpatient Medications   Medication Sig Dispense Refill    buprenorphine (SUBUTEX) 8 MG sublingual tablet SL tablet Place 2 tablets under the tongue Daily. 60 tablet 0    acetaminophen (TYLENOL) 500 MG tablet Take 1 tablet by mouth Every 6 (Six) Hours As Needed for Mild Pain. 30 tablet 0    albuterol (ACCUNEB) 1.25 MG/3ML nebulizer solution Take 3 mL by nebulization Every 6 (Six) Hours As Needed for Wheezing. 360 mL 5    albuterol sulfate  (90 Base) MCG/ACT inhaler Inhale 2 puffs Every 6 (Six) Hours As Needed for Wheezing. 18 g 1    Creon 43710-76842 units capsule delayed-release particles capsule       gabapentin (NEURONTIN) 800 MG tablet Take 800 mg by mouth 2 (Two) Times a Day.      ibuprofen (ADVIL,MOTRIN) 800 MG tablet Take 1 tablet by mouth Every 8 (Eight) Hours As Needed for Moderate Pain (Use with food.). 90 tablet 2    potassium chloride (K-DUR,KLOR-CON) 20 MEQ CR tablet Take 1 tablet by mouth Daily.      QUEtiapine (SEROquel) 25 MG tablet Take 25 mg by mouth every night at bedtime.      Symbicort 160-4.5 MCG/ACT inhaler INHALE TWO PUFFS BY MOUTH TWICE A DAY 10.2 g 5     No current facility-administered medications for this visit.       Past Medical  History:  Past Medical History:   Diagnosis Date    Anxiety     Chronic pancreatitis     COPD (chronic obstructive pulmonary disease)     Depression     GERD (gastroesophageal reflux disease)     Substance abuse        Social History:  Social History     Socioeconomic History    Marital status:      Spouse name: CHULA    Number of children: 2    Highest education level: Some college, no degree   Tobacco Use    Smoking status: Every Day     Current packs/day: 0.50     Average packs/day: 0.5 packs/day for 32.4 years (16.2 ttl pk-yrs)     Types: Cigarettes     Start date: 10/20/1992    Smokeless tobacco: Never   Vaping Use    Vaping status: Some Days    Substances: Flavoring    Devices: Disposable   Substance and Sexual Activity    Alcohol use: Never    Drug use: Not Currently    Sexual activity: Defer     Birth control/protection: None       Social History     Social History Narrative    Social History:    Social Support:  AND SON    Residence: living setting HOME with     Current Employment: [employer]    Education: SOME COLLEGE    Learning Disabilities: NONE    Legal history: NONE    Hobbies/interests: [employer]    Anglican: Moravian    Exercise: SOME    Dietary issues: YES CHRONIC PANCREATITIS    Sleep issues: SOME BUT IS ON MEDS    Caffeine intake: 2-3 CANS OF SODA     history: NONE         Family History:  Family History   Problem Relation Age of Onset    Diabetes Mother     No Known Problems Father          Subjective      Review of Systems:   Review of Systems    PHQ-9 Score:       SANJAY-7 Score:        Patient History:   The following portions of the patient's history were reviewed and updated as appropriate: allergies, current medications, past family history, past medical history, past social history, past surgical history and problem list.     Social:  Social History     Socioeconomic History    Marital status:      Spouse name: CHULA    Number of children: 2     "Highest education level: Some college, no degree   Tobacco Use    Smoking status: Every Day     Current packs/day: 0.50     Average packs/day: 0.5 packs/day for 32.4 years (16.2 ttl pk-yrs)     Types: Cigarettes     Start date: 10/20/1992    Smokeless tobacco: Never   Vaping Use    Vaping status: Some Days    Substances: Flavoring    Devices: Disposable   Substance and Sexual Activity    Alcohol use: Never    Drug use: Not Currently    Sexual activity: Defer     Birth control/protection: None       Medications:     Current Outpatient Medications:     buprenorphine (SUBUTEX) 8 MG sublingual tablet SL tablet, Place 2 tablets under the tongue Daily., Disp: 60 tablet, Rfl: 0    acetaminophen (TYLENOL) 500 MG tablet, Take 1 tablet by mouth Every 6 (Six) Hours As Needed for Mild Pain., Disp: 30 tablet, Rfl: 0    albuterol (ACCUNEB) 1.25 MG/3ML nebulizer solution, Take 3 mL by nebulization Every 6 (Six) Hours As Needed for Wheezing., Disp: 360 mL, Rfl: 5    albuterol sulfate  (90 Base) MCG/ACT inhaler, Inhale 2 puffs Every 6 (Six) Hours As Needed for Wheezing., Disp: 18 g, Rfl: 1    Creon 38572-58524 units capsule delayed-release particles capsule, , Disp: , Rfl:     gabapentin (NEURONTIN) 800 MG tablet, Take 800 mg by mouth 2 (Two) Times a Day., Disp: , Rfl:     ibuprofen (ADVIL,MOTRIN) 800 MG tablet, Take 1 tablet by mouth Every 8 (Eight) Hours As Needed for Moderate Pain (Use with food.)., Disp: 90 tablet, Rfl: 2    potassium chloride (K-DUR,KLOR-CON) 20 MEQ CR tablet, Take 1 tablet by mouth Daily., Disp: , Rfl:     QUEtiapine (SEROquel) 25 MG tablet, Take 25 mg by mouth every night at bedtime., Disp: , Rfl:     Symbicort 160-4.5 MCG/ACT inhaler, INHALE TWO PUFFS BY MOUTH TWICE A DAY, Disp: 10.2 g, Rfl: 5    Objective     Physical Exam:  Physical Exam    Vital Signs:   Vitals:    03/06/25 1538   BP: 104/72   Pulse: 90   SpO2: 99%   Weight: 56.7 kg (125 lb)   Height: 165.1 cm (65\")       Pill count:  (SUBUTEX 12 " PILLS)    Body mass index is 20.8 kg/m².     MENTAL STATUS EXAM   General Appearance:  Cleanly groomed and dressed  Eye Contact:  Good eye contact  Attitude:  Cooperative  Motor Activity:  Normal gait, posture  Muscle Strength:  Normal  Speech:  Normal rate, tone, volume  Language:  Spontaneous  Mood and affect:  Normal, pleasant  Hopelessness:  1  Loneliness: 1  Thought Process:  Logical  Associations/ Thought Content:  No delusions  Hallucinations:  None  Suicidal Ideations:  Not present  Homicidal Ideation:  Not present          Assessment / Plan      Diagnoses and all orders for this visit:    1. Opioid dependence in remission (Primary)  -     Full Screen with 0-4 consecutive days of abstinence (MAT)  - Urine, Clean Catch; Future  -     buprenorphine (SUBUTEX) 8 MG sublingual tablet SL tablet; Place 2 tablets under the tongue Daily.  Dispense: 60 tablet; Refill: 0    2. Cervical spondylosis    3. Panlobular emphysema           PLAN:  Safety: No acute safety concerns  Risk Assessment: Risk of self-harm acutely is low. Risk of self-harm chronically is also low, but could be further elevated in the event of treatment noncompliance and/or AODA.  For her cervical spondylosis we have prescribed her Flexeril through the family practice office and started on physical therapy at the [facility] PT office.  For her COPD she needs to resume her albuterol inhaler which she has refills up at the pharmacy on a as needed basis.  She is using her Stiolto and Symbicort both every day which means she is using too much long-acting beta agonist which will fix the next time she comes to the family practice office in 3 weeks.    TREATMENT PLAN/GOALS: Continue supportive psychotherapy efforts and medications as indicated. Treatment and medication options discussed during today's visit. Patient acknowledged and verbally consented to continue with current treatment plan and was educated on the importance of  compliance with treatment and follow-up appointments.    MEDICATION ISSUES:  LILY reviewed as expected.  Discussed medication options and treatment plan of prescribed medication as well as the risks, benefits, and side effects including potential falls, possible impaired driving and metabolic adversities among others. Patient is agreeable to call the office with any worsening of symptoms or onset of side effects. Patient is agreeable to call 911 or go to the nearest ER should he/she begin having SI/HI. No medication side effects or related complaints today.     Return in about 1 month (around 4/6/2025) for Start therapy with Suzie AWAD..             This document has been electronically signed by Joe Zhou MD  March 6, 2025 16:16 EST      Part of this note may be an electronic transcription/translation of spoken language to printed text using the Dragon Dictation System.   No

## 2025-03-14 DIAGNOSIS — F11.21 OPIOID DEPENDENCE IN REMISSION: ICD-10-CM

## 2025-03-26 ENCOUNTER — OFFICE VISIT (OUTPATIENT)
Age: 50
End: 2025-03-26
Payer: COMMERCIAL

## 2025-03-26 DIAGNOSIS — F11.21 OPIOID DEPENDENCE IN REMISSION: Primary | ICD-10-CM

## 2025-03-26 NOTE — PROGRESS NOTES
Time In: 3:00 PM   Time out: 3:58 PM  Name of PCP: Joe Zhou MD   Referral source: No referring provider defined for this encounter.    Yevgeniy Neumann is a 49 y.o. female who presents today for initial evaluation      Chief Complaint: Substance abuse       History of Present Illness:     History of Present Illness  The patient is a 49-year-old female being seen today for an initial evaluation. She has been under the care of Dr. Joe Zhou for approximately 5 to 6 years and is currently on Subutex 8 mg twice daily. She had an allergic reaction to naltrexone in Suboxone, which caused blisters in her mouth and hives around her face and neck. She has undergone treatment twice, once in [place], [place] about 25 years ago and another time at [facility] in [place] about 15 years ago. She was with [facility] from 2000 to 2023 and then moved to [facility] with Dr. Clark. She continued outpatient counseling with [facility] for a while, and has now transferred all care to this location. She has never relapsed since being with Dr. Zhou. She believes becoming a grandmother has helped her stay sober. She sees  She reports no cravings today. She started smoking at age 14 or 15 but never drank alcohol. She began using opioids after a gallbladder attack led to hospitalization for 4 months, during which she was given Percocet and morphine three times a day. After her release, she was given prescriptions and started seeing a doctor, which led to her opioid use. She has been  for 31 years and has 2 children. She was previously  at age 18 for 2 years. She had a rough start in life, with her father being murdered when she was a baby and her mother dying of alcoholism when she was 15. She lived with her grandmother until she passed away when she was about 17. She then lived with her older brother for a while before getting . She recalls some traumatic experiences but can not  remember details. She reports no domestic violence in her current marriage. Her  had issues with alcohol years ago but quit cold turkey after a heart attack. They currently live alone in their home. She is Protestant and graduated from high school.  She reports no previous suicide attempts or thoughts of suicide. She sometimes experiences deep depression but it has been a long time since she has felt that way.      Before that, She has been  for 31 years and has 2 children. She was previously  at age 18 for 2 years.    FAMILY HISTORY  Her mother  of alcoholism at the age of 32.  Her father was murdered when she was a baby.  Her sister is a bad diabetic has a JONO, and is currently in a nursing home.  Her brother had substance abuse issues but got help.  Another brother  from an OxyContin and Xanax overdose about 20 years ago.    ALLERGIES  She is allergic to NALTREXONE.    MEDICATIONS  Current: buprenorphine      Patient adamantly and convincingly denies current suicidal or homicidal ideation or perceptual disturbance.     Childhood Experiences:   Has patient experienced a major accident or tragic events as a child? yes She had a rough start in life, with her father being murdered when she was a baby and her mother dying of alcoholism when she was 15. She lived with her grandmother until she passed away when she was about 17. She then lived with her older brother for a while before getting .at 18.    Has patient experienced any other significant life events or trauma (such as verbal, physical, sexual abuse)? Yes, She recalls some traumatic experiences but can not remember details recognizing that she has likely suppressed them.    Significant Life Events:  Has patient been through or witnessed a divorce? Yes,she was previously  at age 18 for 2 years raising her first child then .    Has patient experienced a death / loss of relationship? Yes, her father was murdered  when she was a baby and her mother  of alcoholism when she was 15.    Has patient experienced a major accident or tragic events? no     Has patient experienced any other significant life events or trauma (such as verbal, physical, sexual abuse)? no    Social History:   Social History     Socioeconomic History    Marital status:      Spouse name: CHULA    Number of children: 2    Highest education level: Some college, no degree   Tobacco Use    Smoking status: Every Day     Current packs/day: 0.50     Average packs/day: 0.5 packs/day for 32.4 years (16.2 ttl pk-yrs)     Types: Cigarettes     Start date: 10/20/1992    Smokeless tobacco: Never   Vaping Use    Vaping status: Some Days    Substances: Flavoring    Devices: Disposable   Substance and Sexual Activity    Alcohol use: Never    Drug use: Not Currently    Sexual activity: Defer     Birth control/protection: None       Marital Status:     Patient's current living situation: Patient lives with spouse     Support system: extended family    Difficulty getting along with peers: no    Difficulty making new friendships: no    Difficulty maintaining friendships: no    Close with family members: yes    Religous: yes    Work History:  Highest level of education obtained: 12th grade    Ever been active duty in the ? 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 She has been doing this for about 4 years.she worked at different places, including as an  at [employer] and working with her  on his job site.     Legal History:  public intoxication, but no DUIs.    Past Medical History:  Past Medical History:   Diagnosis Date    Anxiety     Chronic pancreatitis      COPD (chronic obstructive pulmonary disease)     Depression     GERD (gastroesophageal reflux disease)     Substance abuse        Past Surgical History:  Past Surgical History:   Procedure Laterality Date     SECTION      x2    CHOLECYSTECTOMY      D & C WITH SUCTION      x2    PANCREAS SURGERY      TUBAL ABDOMINAL LIGATION         Physical Exam:   not currently breastfeeding. There is no height or weight on file to calculate BMI.     History of psychiatric treatment or hospitalization: No    Allergy:   No Known Allergies     Current Medications:   Current Outpatient Medications   Medication Sig Dispense Refill    acetaminophen (TYLENOL) 500 MG tablet Take 1 tablet by mouth Every 6 (Six) Hours As Needed for Mild Pain. 30 tablet 0    albuterol (ACCUNEB) 1.25 MG/3ML nebulizer solution Take 3 mL by nebulization Every 6 (Six) Hours As Needed for Wheezing. 360 mL 5    albuterol sulfate  (90 Base) MCG/ACT inhaler Inhale 2 puffs Every 6 (Six) Hours As Needed for Wheezing. 18 g 1    buprenorphine (SUBUTEX) 8 MG sublingual tablet SL tablet Place 2 tablets under the tongue Daily. 60 tablet 0    Creon 80541-58947 units capsule delayed-release particles capsule       gabapentin (NEURONTIN) 800 MG tablet Take 800 mg by mouth 2 (Two) Times a Day.      ibuprofen (ADVIL,MOTRIN) 800 MG tablet Take 1 tablet by mouth Every 8 (Eight) Hours As Needed for Moderate Pain (Use with food.). 90 tablet 2    potassium chloride (K-DUR,KLOR-CON) 20 MEQ CR tablet Take 1 tablet by mouth Daily.      QUEtiapine (SEROquel) 25 MG tablet Take 25 mg by mouth every night at bedtime.      Symbicort 160-4.5 MCG/ACT inhaler INHALE TWO PUFFS BY MOUTH TWICE A DAY 10.2 g 5     No current facility-administered medications for this visit.       Family History:  Family History   Problem Relation Age of Onset    Diabetes Mother     No Known Problems Father        Problem List:  Patient Active Problem List   Diagnosis    Chronic pancreatitis    Panic  attack    Anxiety    Hypokalemia    Pancreatitis    Nonspecific syndrome suggestive of viral illness         History of Substance Use:   Patient answered YES to experiencing two or more of the following problems related to substance use: using more than intended or over longer period than intended; difficulty quitting or cutting back use; spending a great deal of time obtaining, using, or recovering from using; craving or strong desire or urge to use;  work and/or school problems; financial problems; family problems; using in dangerous situations; physical or mental health problems; relapse; feelings of guilt or remorse about use; times when used and/or drank alone; needing to use more in order to achieve the desired effect; illness or withdrawal when stopping or cutting back use; using to relieve or avoid getting ill or developing withdrawal symptoms; and black outs and/or memory issues when using.        The patient initiated smoking at approximately age 14 and reports abstaining from alcohol use.  At age 21, she developed pancreatitis necessitating a 4-month hospitalization during which she received Percocet Toradol and morphine 3 times daily.  Post discharge she was prescribed opioid pain medication leading to an opioid use disorder supplemented by illicitly obtained opioids when prescriptions were reduced or ceased.  She reports past use of OxyContin and Ultram, consuming multiple bowl Ultram pills to achieve a high ache into Percocet.  Currently the patient is prescribed Subutex 8 mg twice daily regimen maintained for approximately 5 to 6 years.  She previously tried Suboxone but experienced an allergic reaction including mouth blisters and hives.  She reports no current cravings.  The patient has been under the care of Dr. Joe Zhou for her opioid use disorder for the last 6 years with her prior inpatient treatment in an unknown location in [facility] and [facility].  She reports no relapses since commencing treatment with Dr. Zhou.  Initially opioid use was medically necessary for pain management due to severe pancreatitis, later serving as a coping mechanism for challenging life circumstances and emotional distress.  Patient attributes her sustained recovery to becoming a grandmother and aspiring to be a reliable caregiver for her grandchildren.  She demonstrates a strong commitment to recovery, actively engaging in therapy and adhering to her medication regimen.      PHQ-Score Total:  PHQ-9 Total Score: 7    SANJAY-7 Score Total:  Over the last two weeks, how often have you been bothered by the following problems?  Feeling nervous, anxious or on edge: Not at all  Not being able to stop or control worrying: Several days  Worrying too much about different things: Nearly every day  Trouble Relaxing: More than half the days  Being so restless that it is hard to sit still: Not at all  Becoming easily annoyed or irritable: Not at all  Feeling afraid as if something awful might happen: Not at all  SANJAY 7 Total Score: 6  If you checked any problems, how difficult have these problems made it for you to do your work, take care of things at home, or get along with other people: Somewhat difficult    MENTAL STATUS EXAM   General Appearance:  Cleanly groomed and dressed  Eye Contact:  Good eye contact  Attitude:  Cooperative  Motor Activity:  Normal gait, posture  Muscle Strength:  Normal  Speech:  Normal rate, tone, volume  Mood and affect:  Normal, pleasant  Hopelessness:  Denies  Loneliness: Denies  Thought Process:  Logical  Associations/ Thought Content:  No delusions  Hallucinations:  None  Suicidal Ideations:  Not present  Homicidal Ideation:  Not present  Sensorium:  Alert  Orientation:  Person, place, time and situation  Immediate Recall, Recent, and Remote Memory:  Intact  Attention Span/ Concentration:  Good  Fund of Knowledge:   Appropriate for age and educational level  Intellectual Functioning:  Average range  Insight:  Good  Judgement:  Good  Reliability:  Good  Impulse Control:  Good       Impression/Formulation:  Patient appeared alert and oriented.  Patient is voluntarily requesting to begin outpatient therapy at [facility].  Patient is receptive to assistance with maintaining a stable lifestyle.  Patient presents with history of Substance abuse. Patient is agreeable to attend routine therapy sessions.  Patient expressed desire to maintain stability and participate in the therapeutic process.      Assessment and Plan:     Assessment & Plan  1. Opioid Use Disorder.  She has been under the care of Dr. Joe Zhou for the past 10 years and has been using buprenorphine compliantly, currently on 16 mg sublingually every day. She reports no cravings and has not relapsed since starting treatment with Dr. Clark. She will continue her current medication regimen and monthly therapy sessions. A relapse prevention plan will be discussed at the next visit.    Treatment Plans and Recommendations  Continue current medication regimen of buprenorphine 16 mg sublingually every day.  Continue monthly therapy sessions.  Discuss relapse prevention plan at the next visit.  Follow-up appointment scheduled for 04/28/2025.      Follow-up  The patient will follow up on 04/28/2025.      Visit Diagnoses:    ICD-10-CM ICD-9-CM   1. Opioid dependence in remission  F11.21 304.03        Prognosis: Good with Ongoing Treatment     Return in 1 month (on 4/28/2025) for Next scheduled follow up.      Treatment Plan: Continue supportive psychotherapy efforts and medications as indicated. Obtain release of information for current treatment team for continuity of care as needed. Patient will adhere to medication regimen as prescribed and report any side effects. Patient will contact this office, call 911 or present to the nearest  emergency room should suicidal or homicidal ideations occur.    Short Term Goals: Patient will be compliant with medication, and patient will have no significant medication related side effects.  Patient will be engaged in psychotherapy as indicated.  Patient will report subjective improvement of symptoms.    Long Term Goals: To stabilize mood and treat/improve subjective symptoms, the patient will stay out of the hospital, the patient will be at an optimal level of functioning, and the patient will take all medications as prescribed.The patient verbalized understanding and agreement with goals that were mutually set.    Crisis Plan:  If symptoms/behaviors persist, patient will present to the nearest hospital for an assessment. Advised patient of [facility] 24/7 assessment services.            This document has been electronically signed by RHIANNA Gilmore  March 27, 2025 15:10 EDT     Part of this note may be an electronic transcription/translation of spoken language to printed text using the Dragon Dictation System.     Patient or patient representative verbalized consent for the use of Ambient Listening during the visit with  RHIANNA Gilmore for chart documentation. 3/27/2025  08:32 EDT

## 2025-04-03 ENCOUNTER — TELEMEDICINE (OUTPATIENT)
Age: 50
End: 2025-04-03
Payer: COMMERCIAL

## 2025-04-03 DIAGNOSIS — F17.200 NICOTINE DEPENDENCE WITH CURRENT USE: ICD-10-CM

## 2025-04-03 DIAGNOSIS — F11.21 OPIOID DEPENDENCE IN REMISSION: Primary | ICD-10-CM

## 2025-04-03 RX ORDER — BUPRENORPHINE 8 MG/1
16 TABLET SUBLINGUAL DAILY
Qty: 60 TABLET | Refills: 0 | Status: SHIPPED | OUTPATIENT
Start: 2025-04-03
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
## 2025-05-06 ENCOUNTER — OFFICE VISIT (OUTPATIENT)
Age: 50
End: 2025-05-06
Payer: COMMERCIAL

## 2025-05-06 VITALS
HEIGHT: 65 IN | DIASTOLIC BLOOD PRESSURE: 67 MMHG | WEIGHT: 126.4 LBS | HEART RATE: 95 BPM | BODY MASS INDEX: 21.06 KG/M2 | OXYGEN SATURATION: 98 % | SYSTOLIC BLOOD PRESSURE: 108 MMHG

## 2025-05-06 DIAGNOSIS — F11.21 OPIOID DEPENDENCE IN REMISSION: Primary | ICD-10-CM

## 2025-05-06 DIAGNOSIS — M47.812 CERVICAL SPONDYLOSIS: ICD-10-CM

## 2025-05-06 RX ORDER — BUPRENORPHINE 8 MG/1
16 TABLET SUBLINGUAL DAILY
Qty: 60 TABLET | Refills: 0 | Status: SHIPPED | OUTPATIENT
Start: 2025-05-06

## 2025-05-06 RX ORDER — GABAPENTIN 800 MG/1
800 TABLET ORAL 2 TIMES DAILY
Qty: 60 TABLET | Refills: 2 | Status: SHIPPED | OUTPATIENT
Start: 2025-05-06

## 2025-05-06 NOTE — PROGRESS NOTES
Office  Note     Patient Name: Magi Neumann  : 1975   MRN: [id number]     Referring Provider: Joe Zhou MD    Chief Complaint: Substance use    History of Present Illness:   HPI    Magi Neumann is a 49 y.o. female who is here today for follow up related to her opioid use disorder.  She is taking her buprenorphine compliantly every day.  She has had no relapses on opiates.  She does complain of daily chronic pain in her neck but more so in her legs from her neuropathy which is why she takes gabapentin twice a day.  She needs a refill on her buprenorphine and her gabapentin.    She has a therapy appointment with Suzie Davis tomorrow the last appointment she made was  which is about 6 weeks ago.  Her last office visit was April 3 which is about 5 weeks ago.    The patient is complaining about a recurrence of her acute sinusitis that we treated at the PCP office 3 months ago.  She does not have a follow-up appointment at that office currently which I told her she needs to schedule in order to manage her medical problems.  By the time she gets another appointment there in 2 months will be time to refill her Stiolto for her COPD.  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  She has got at least 1 staff person at the been there 6 weeks in training and is going to have to be terminated.      Triggers: No significant triggers beyond her chronic physical pain which she manages fairly well with the gabapentin twice a day and by modifying her behavior to not over work.    Cravings: Minimal and infrequent.    Relapse Prevention: She needs to stick to a once a month schedule for her to see me and to see you the therapist.  She is behind on both counts.    UDS Confirmation: UDS negative and that is shows buprenorphine and gabapentin which were both appropriate prescriptions.    LILY (PDMP) Reviewed for  Current/Active Medications      Past Surgical History:  Past Surgical History:   Procedure Laterality Date     SECTION      x2    CHOLECYSTECTOMY      D & C WITH SUCTION      x2    PANCREAS SURGERY      TUBAL ABDOMINAL LIGATION         Problem List:  Patient Active Problem List   Diagnosis    Chronic pancreatitis    Panic attack    Anxiety    Hypokalemia    Pancreatitis    Nonspecific syndrome suggestive of viral illness       Allergy:   Allergies   Allergen Reactions    Buprenorphine Hcl-Naloxone Hcl Rash        Current Medications:   Current Outpatient Medications   Medication Sig Dispense Refill    acetaminophen (TYLENOL) 500 MG tablet Take 1 tablet by mouth Every 6 (Six) Hours As Needed for Mild Pain. 30 tablet 0    albuterol (ACCUNEB) 1.25 MG/3ML nebulizer solution Take 3 mL by nebulization Every 6 (Six) Hours As Needed for Wheezing. 360 mL 5    albuterol sulfate  (90 Base) MCG/ACT inhaler Inhale 2 puffs Every 6 (Six) Hours As Needed for Wheezing. 18 g 1    buprenorphine (SUBUTEX) 8 MG sublingual tablet SL tablet Place 2 tablets under the tongue Daily. 60 tablet 0    Creon 03276-85622 units capsule delayed-release particles capsule       gabapentin (NEURONTIN) 800 MG tablet Take 1 tablet by mouth 2 (Two) Times a Day. 60 tablet 2    ibuprofen (ADVIL,MOTRIN) 800 MG tablet Take 1 tablet by mouth Every 8 (Eight) Hours As Needed for Moderate Pain (Use with food.). 90 tablet 2    potassium chloride (K-DUR,KLOR-CON) 20 MEQ CR tablet Take 1 tablet by mouth Daily.      QUEtiapine (SEROquel) 25 MG tablet Take 1 tablet by mouth every night at bedtime.      Symbicort 160-4.5 MCG/ACT inhaler INHALE TWO PUFFS BY MOUTH TWICE A DAY 10.2 g 5     No current facility-administered medications for this visit.       Past Medical History:  Past Medical History:   Diagnosis Date    Anxiety     Chronic pancreatitis     COPD (chronic obstructive pulmonary disease)     Depression     GERD (gastroesophageal reflux disease)      Substance abuse        Social History:  Social History     Socioeconomic History    Marital status:      Spouse name: CHULA    Number of children: 2    Highest education level: Some college, no degree   Tobacco Use    Smoking status: Every Day     Current packs/day: 0.50     Average packs/day: 0.5 packs/day for 32.5 years (16.3 ttl pk-yrs)     Types: Cigarettes     Start date: 10/20/1992    Smokeless tobacco: Never   Vaping Use    Vaping status: Some Days    Substances: Flavoring    Devices: Disposable   Substance and Sexual Activity    Alcohol use: Never    Drug use: Not Currently    Sexual activity: Defer     Birth control/protection: None       Social History     Social History Narrative    Social History:    Social Support:  AND SON    Residence: living setting HOME with     Current Employment: [employer]    Education: SOME COLLEGE    Learning Disabilities: NONE    Legal history: NONE    Hobbies/interests: [employer]ALS    Taoist: Voodoo    Exercise: SOME    Dietary issues: YES CHRONIC PANCREATITIS    Sleep issues: SOME BUT IS ON MEDS    Caffeine intake: 2-3 CANS OF SODA     history: NONE         Family History:  Family History   Problem Relation Age of Onset    Diabetes Mother     No Known Problems Father          Subjective      Review of Systems:   Review of Systems    PHQ-9 Score:       SANJAY-7 Score:        Patient History:   The following portions of the patient's history were reviewed and updated as appropriate: allergies, current medications, past family history, past medical history, past social history, past surgical history and problem list.     Social:  Social History     Socioeconomic History    Marital status:      Spouse name: CHULA    Number of children: 2    Highest education level: Some college, no degree   Tobacco Use    Smoking status: Every Day     Current packs/day: 0.50     Average packs/day: 0.5 packs/day for 32.5 years (16.3 ttl pk-yrs)      "Types: Cigarettes     Start date: 10/20/1992    Smokeless tobacco: Never   Vaping Use    Vaping status: Some Days    Substances: Flavoring    Devices: Disposable   Substance and Sexual Activity    Alcohol use: Never    Drug use: Not Currently    Sexual activity: Defer     Birth control/protection: None       Medications:     Current Outpatient Medications:     acetaminophen (TYLENOL) 500 MG tablet, Take 1 tablet by mouth Every 6 (Six) Hours As Needed for Mild Pain., Disp: 30 tablet, Rfl: 0    albuterol (ACCUNEB) 1.25 MG/3ML nebulizer solution, Take 3 mL by nebulization Every 6 (Six) Hours As Needed for Wheezing., Disp: 360 mL, Rfl: 5    albuterol sulfate  (90 Base) MCG/ACT inhaler, Inhale 2 puffs Every 6 (Six) Hours As Needed for Wheezing., Disp: 18 g, Rfl: 1    buprenorphine (SUBUTEX) 8 MG sublingual tablet SL tablet, Place 2 tablets under the tongue Daily., Disp: 60 tablet, Rfl: 0    Creon 57436-71418 units capsule delayed-release particles capsule, , Disp: , Rfl:     gabapentin (NEURONTIN) 800 MG tablet, Take 1 tablet by mouth 2 (Two) Times a Day., Disp: 60 tablet, Rfl: 2    ibuprofen (ADVIL,MOTRIN) 800 MG tablet, Take 1 tablet by mouth Every 8 (Eight) Hours As Needed for Moderate Pain (Use with food.)., Disp: 90 tablet, Rfl: 2    potassium chloride (K-DUR,KLOR-CON) 20 MEQ CR tablet, Take 1 tablet by mouth Daily., Disp: , Rfl:     QUEtiapine (SEROquel) 25 MG tablet, Take 1 tablet by mouth every night at bedtime., Disp: , Rfl:     Symbicort 160-4.5 MCG/ACT inhaler, INHALE TWO PUFFS BY MOUTH TWICE A DAY, Disp: 10.2 g, Rfl: 5    Objective     Physical Exam:  Physical Exam    Vital Signs:   Vitals:    05/06/25 1120   BP: 108/67   Pulse: 95   SpO2: 98%   Weight: 57.3 kg (126 lb 6.4 oz)   Height: 165.1 cm (65\")       Pill count:  (subutex 10,  gaabapentin didnt bring)    Body mass index is 21.03 kg/m².     MENTAL STATUS EXAM   General Appearance:  Cleanly groomed and dressed  Eye Contact:  Good eye " contact  Attitude:  Cooperative  Motor Activity:  Normal gait, posture  Muscle Strength:  Normal  Speech:  Normal rate, tone, volume  Language:  Spontaneous  Mood and affect:  Normal, pleasant  Hopelessness:  Denies  Loneliness: Denies  Thought Process:  Logical  Associations/ Thought Content:  No delusions  Hallucinations:  None  Suicidal Ideations:  Not present  Homicidal Ideation:  Not present          Assessment / Plan      Diagnoses and all orders for this visit:    1. Opioid dependence in remission (Primary)  -     Full Screen with 0-4 consecutive days of abstinence (MAT)  - Urine, Clean Catch; Future  -     buprenorphine (SUBUTEX) 8 MG sublingual tablet SL tablet; Place 2 tablets under the tongue Daily.  Dispense: 60 tablet; Refill: 0    2. Cervical spondylosis  -     gabapentin (NEURONTIN) 800 MG tablet; Take 1 tablet by mouth 2 (Two) Times a Day.  Dispense: 60 tablet; Refill: 2           PLAN:  Safety: No acute safety concerns  Risk Assessment: Risk of self-harm acutely is low. Risk of self-harm chronically is also low, but could be further elevated in the event of treatment noncompliance and/or AODA.    TREATMENT PLAN/GOALS: Continue supportive psychotherapy efforts and medications as indicated. Treatment and medication options discussed during today's visit. Patient acknowledged and verbally consented to continue with current treatment plan and was educated on the importance of compliance with treatment and follow-up appointments.    MEDICATION ISSUES:  LILY reviewed as expected.  Discussed medication options and treatment plan of prescribed medication as well as the risks, benefits, and side effects including potential falls, possible impaired driving and metabolic adversities among others. Patient is agreeable to call the office with any worsening of symptoms or onset of side effects. Patient is agreeable to call 911 or go to the nearest ER should he/she begin having SI/HI. No medication side effects  or related complaints today.     Return in about 1 month (around 6/6/2025).             This document has been electronically signed by Joe Zhou MD  May 6, 2025 13:29 EDT      Part of this note may be an electronic transcription/translation of spoken language to printed text using the Dragon Dictation System.

## 2025-05-07 ENCOUNTER — TELEMEDICINE (OUTPATIENT)
Age: 50
End: 2025-05-07
Payer: COMMERCIAL

## 2025-05-07 DIAGNOSIS — F17.200 NICOTINE DEPENDENCE WITH CURRENT USE: ICD-10-CM

## 2025-05-07 DIAGNOSIS — F11.21 OPIOID DEPENDENCE IN REMISSION: Primary | ICD-10-CM

## 2025-05-07 NOTE — PROGRESS NOTES
[facility]   [address]  [place] [address]  [phone]     Referring physician/provider: No ref. provider found   PCP: Joe Zhou MD    Telehealth Encounter  Date of service: 5/8/2025    Time in: 1:59 PM  Time Out: 2:42 PM    Chief Complaint  Substance Abuse    Mode of Visit: Video   Location of patient: -HOME-   Location of provider: +[facility]+   You have chosen to receive care through a telehealth visit.   The patient has signed the video visit consent form.   The visit included audio and video interaction. No technical issues occurred during this visit.     This provider is located at [facility] using a secure AetherPalt Video Visit through luma-id. Patient is being seen remotely via telehealth at home address in [place] and stated they are in a secure environment for this session. The patient's condition being diagnosed/treated is appropriate for telemedicine. The provider identified herself as well as her credentials. The patient, and/or patients guardian, consent to be seen remotely, and when consent is given they understand that the consent allows for patient identifiable information to be sent to a third party as needed. They may refuse to be seen remotely at any time. The electronic data is encrypted and password protected, and the patient and/or guardian has been advised of the potential risks to privacy not withstanding such measures.     You have chosen to receive care through a telehealth visit.  Do you consent to use a video/audio connection for your medical care today? [x]  Yes     []  No     Referring Provider: Joe Zhou MD    Progress Note     History of Present Illness  The patient is a 49-year-old female being seen today for a follow-up psychotherapy appointment.  She is compliant with her daily buprenorphine regimen and has not experienced any relapses on opiates. She reports minimal and  infrequent cravings. She does not engage in any support groups or 12-step group work, although she previously attended AA. She has been under the care of Dr. Zhou for an extended period and is scheduled to see him again in 06/2025.  She experiences chronic pain in her neck and legs, which is managed with gabapentin twice daily. She identifies her chronic pain as a significant trigger but reports effective management. She does not report any other triggers that would prompt substance use. She does not crave drugs or the associated high.She reports no significant stressors and maintains good sleep hygiene. 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 She has access to case management services at her workplace.            ICD-10-CM ICD-9-CM   1. Opioid dependence in remission  F11.21 304.03   2. Nicotine dependence with current use  F17.200 305.1          Clinical Maneuvering/Intervention:   Assisted patient in processing above session content; acknowledged and normalized patient’s thoughts, feelings, and concerns by utilizing a person-centered approach in efforts to build appropriate rapport and a positive therapeutic relationship with open and honest communication.  Processed and rationalized patients thoughts and feelings regarding substance abuse and mental health concerns..  Discussed triggers associated with patient's substance use, primary trigger identified as pain. She successfully manages her pain..  Also discussed coping skills for patient to implement such as:  Mindfulness and Meditation: Practice mindfulness and meditation to stay present and manage stress.  Deep Breathing Exercises: Use deep breathing techniques to calm the mind and reduce anxiety.  Physical Activity: Engage in regular exercise to improve mood and overall health.  Journaling: Write down thoughts and feelings to process emotions and track progress.  Support Groups: Attend support group  meetings like AA or [employer] for community and accountability.  Healthy Hobbies: Find and engage in hobbies that provide nicolle and distraction from urges.  Reading or Audiobooks: Consume literature related to recovery or other interests to stay motivated and informed..    Allowed patient to freely discuss issues without interruption or judgment. Provided safe, confidential environment to facilitate the development of positive therapeutic relationship and encourage open, honest communication. Assisted patient in identifying risk factors which would indicate the need for higher level of care including thoughts to harm self or others and/or self-harming behavior and encouraged patient to contact this office, call 911, or present to the nearest emergency room should any of these events occur. Discussed crisis intervention services and means to access. Patient adamantly and convincingly denies current suicidal or homicidal ideation or perceptual disturbance.    PHQ-Score Total:  PHQ-9 Total Score:      SANJAY-7 Score Total:  SANJAY-7 Score:                  Mental Status Exam:   Hygiene:   good  Cooperation:  Cooperative  Eye Contact:  Good  Psychomotor Behavior:  Appropriate  Affect:  Appropriate  Mood: normal  Speech:  Normal  Thought Process:  Goal directed  Thought Content:  Normal  Suicidal:  None  Homicidal:  None  Hallucinations:  None  Delusion:  None  Memory:  Intact  Orientation:  Person, Place, Time, and Situation  Reliability:  good  Insight:  Good  Judgement:  Good  Impulse Control:  Good  Physical/Medical Issues:  No      Patient's Support Network Includes:  , extended family, and coworkers    Functional Status: Mild impairment     Progress toward goal: Not at goal    Prognosis: Good with Ongoing Treatment     Medications:     Current Outpatient Medications:     acetaminophen (TYLENOL) 500 MG tablet, Take 1 tablet by mouth Every 6 (Six) Hours As Needed for Mild Pain., Disp: 30 tablet, Rfl: 0     albuterol (ACCUNEB) 1.25 MG/3ML nebulizer solution, Take 3 mL by nebulization Every 6 (Six) Hours As Needed for Wheezing., Disp: 360 mL, Rfl: 5    albuterol sulfate  (90 Base) MCG/ACT inhaler, Inhale 2 puffs Every 6 (Six) Hours As Needed for Wheezing., Disp: 18 g, Rfl: 1    buprenorphine (SUBUTEX) 8 MG sublingual tablet SL tablet, Place 2 tablets under the tongue Daily., Disp: 60 tablet, Rfl: 0    Creon 65659-78013 units capsule delayed-release particles capsule, , Disp: , Rfl:     gabapentin (NEURONTIN) 800 MG tablet, Take 1 tablet by mouth 2 (Two) Times a Day., Disp: 60 tablet, Rfl: 2    ibuprofen (ADVIL,MOTRIN) 800 MG tablet, Take 1 tablet by mouth Every 8 (Eight) Hours As Needed for Moderate Pain (Use with food.)., Disp: 90 tablet, Rfl: 2    potassium chloride (K-DUR,KLOR-CON) 20 MEQ CR tablet, Take 1 tablet by mouth Daily., Disp: , Rfl:     QUEtiapine (SEROquel) 25 MG tablet, Take 1 tablet by mouth every night at bedtime., Disp: , Rfl:     Symbicort 160-4.5 MCG/ACT inhaler, INHALE TWO PUFFS BY MOUTH TWICE A DAY, Disp: 10.2 g, Rfl: 5    Visit Diagnosis/Orders Placed This Visit:    ICD-10-CM ICD-9-CM   1. Opioid dependence in remission  F11.21 304.03   2. Nicotine dependence with current use  F17.200 305.1          Assessment and Plan   Diagnoses and all orders for this visit:    1. Opioid dependence in remission (Primary)    2. Nicotine dependence with current use        Assessment & Plan  Problems:  - Substance Use Disorder  - Chronic Pain  - Chest Pain  - Stress Management    Content of Therapy:  During the session, the patient discussed her ongoing management of substance use disorder, chronic pain, and recent chest pain. She shared that her grandchildren and animals provide significant therapeutic support. 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 Additionally, she mentioned experiencing chest pain over the past few  days, which she suspects might be gas-related. The patient reported no significant stress or sleep issues but acknowledged the challenges of  work stress from home life.    Clinical Impression:  The patient continues to manage her substance use disorder effectively, with no relapses and minimal cravings. Her chronic pain remains a significant issue, but she is managing it with gabapentin. The recent chest pain is concerning and warrants further medical evaluation. Overall, she appears to be coping well with stress and maintaining a healthy work-life balance, though she acknowledges the difficulty of leaving work-related stress at the office.    Therapeutic Intervention:  - Cognitive-behavioral strategies were employed to reframe thoughts about stress and pain management.  - Psychoeducation was provided regarding the importance of seeking medical attention for persistent chest pain.  - Encouraged the patient to continue using her support system and therapeutic activities, such as caring for her animals.    Plan:  - Continue current medication regimen for substance use disorder and chronic pain.  - Seek immediate medical attention if chest pain persists beyond tomorrow.  - Maintain support system and therapeutic activities.  - Ensure open communication with colleagues or  about work-related stress.    Follow-up:  The patient will follow up on 06/11/2025 at 2:00 PM.    Notes & Risk Factors:  - Risk factors: Chronic pain, chest pain, smoking.  - Protective factors: Strong support system, therapeutic activities, effective coping strategies.      Safety: No acute safety concerns  Risk Assessment: Risk of self-harm acutely is low. Risk of self-harm chronically is also low, but could be further elevated in the event of treatment noncompliance and/or AODA.    Crisis Plan:  Symptoms and/or behaviors to indicate a crisis: Abuse of substances    What calming techniques or other strategies will patient use  to de-escalate and stay safe: slow down, breathe, visualize calming self, think it though, listen to music, change focus, take a walk    Who is one person patient can contact to assist with de-escalation? Jose F    Treatment Plan/Goals: Patient will continue supportive psychotherapy efforts and medication regimen as prescribed. Therapist will provide Cognitive Behavioral Therapy to assist patient in improving functioning and gaining coping skills, maintaining stability, and avoiding decompensation and the need for higher level of care. Plan for treatment was discussed during today's visit. Patient acknowledged and verbally consented to continue with current treatment plan and was educated on the importance of compliance with treatment and follow-up appointments.     Patient will contact this office, call 911 or present to the nearest emergency room should suicidal or homicidal ideations occur.     Follow Up:   No follow-ups on file.    Patient's questions were answered.  Thank you for allowing me to participate in the care of this patient.  Dictated Utilizing Dragon Dictation. Please note that portions of this note were completed with a voice recognition program. Part of this note may be an electronic transcription/translation of spoken language to printed text using the Dragon Dictation System.      Patient or patient representative verbalized consent for the use of Ambient Listening during the visit with  RHIANNA Gilmore for chart documentation. 5/8/2025  11:06 EDT    [facility]   RHIANNA Gilmore  05/08/25  11:03 EDT

## 2025-05-09 ENCOUNTER — PATIENT ROUNDING (BHMG ONLY) (OUTPATIENT)
Age: 50
End: 2025-05-09
Payer: COMMERCIAL

## 2025-05-09 NOTE — PROGRESS NOTES
May 9, 2025    Hello, may I speak with Magi Neumann?    My name is TOBIN      I am  with [facility]  [address]  [place] [address] [phone].    Before we get started may I verify your date of birth? 1975    I am calling to officially welcome you to our practice and ask about your recent visit. Is this a good time to talk? No DID NOT ANSWER PHONE WENT TO     Tell me about your visit with us. What things went well?  DID NOT ANSWER PHONE WENT TO        We're always looking for ways to make our patients' experiences even better. Do you have recommendations on ways we may improve?  no DID NOT ANSWER PHONE WENT TO     Overall were you satisfied with your first visit to our practice? No DID NOT ANSWER PHONE WENT TO        I appreciate you taking the time to speak with me today. Is there anything else I can do for you? No DID NOT ANSWER PHONE WENT TO       Thank you, and have a great day.

## 2025-05-14 ENCOUNTER — TREATMENT (OUTPATIENT)
Dept: PHYSICAL THERAPY | Facility: CLINIC | Age: 50
End: 2025-05-14
Payer: COMMERCIAL

## 2025-05-14 DIAGNOSIS — M54.2 PAIN, NECK: ICD-10-CM

## 2025-05-14 DIAGNOSIS — R29.3 POOR POSTURE: ICD-10-CM

## 2025-05-14 DIAGNOSIS — M47.812 SPONDYLOSIS WITHOUT MYELOPATHY OR RADICULOPATHY, CERVICAL REGION: Primary | ICD-10-CM

## 2025-05-14 DIAGNOSIS — M54.2 PAINFUL CERVICAL ROM: ICD-10-CM

## 2025-05-14 NOTE — PROGRESS NOTES
Physical Therapy Initial Evaluation and Plan of Care  [address], [address] [place] [address]        Patient: Magi Neumann   : 1975  Diagnosis/ICD-10 Code:  Spondylosis without myelopathy or radiculopathy, cervical region [M47.812]  Referring practitioner: Joe Zhou MD  Date of Initial Visit: 2025  Today's Date: 2025  Patient seen for 1 sessions         X-ray results:  Multilevel cervical spondylosis including probable moderate to severe facet arthropathy in the mid to upper cervical spine resulting in grade 1 anterolisthesis C4 on C5.      Subjective Questionnaire: NDI:      Subjective Evaluation    History of Present Illness  Mechanism of injury: Pt reports that she has been having neck pain for several years with no MARCIAL but states that pain has worsened the past 6 months.  Pt reports that pain is localized primarily in R neck to her shoulder.  Pt states that prolonged sitting at computer increases her pain.  Pt reports intermittent headaches, primarily in R temple.  Pt is right handed.  Pt is taking gabapentin and Ibuprofen.  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  Pt denies N/T symptoms.     Pain  Current pain ratin  At best pain rating: 3  At worst pain rating: 10  Quality: dull ache    Patient Goals  Patient goals for therapy: decreased pain, increased motion and increased strength             Objective          Static Posture     Head  Forward.    Shoulders  Elevated and rounded.    Scapulae  Left winging, right winging, left protracted and right protracted.    Active Range of Motion   Cervical/Thoracic Spine   Cervical    Flexion: 15 degrees with pain  Extension: 13 degrees   Left rotation: 61 degrees WFL  Right rotation: 46 degrees     Strength/Myotome Testing     Left Shoulder     Planes of Motion   Flexion: 4+   Extension: 5   Abduction: 4+     Right Shoulder     Planes of Motion   Flexion: 4+   Extension: 5   Abduction: 4+     Left Elbow   Flexion: 5  Extension:  5    Right Elbow   Flexion: 5  Extension: 5    Left Wrist/Hand   Wrist extension: 5  Wrist flexion: 5    Right Wrist/Hand   Wrist extension: 5  Wrist flexion: 5    Tests   Cervical     Left   Negative active compression ([place]), cervical distraction and Spurling's sign.     Right   Negative active compression ([place]), cervical distraction and Spurling's sign.      General Comments     Cervical/Thoracic Comments  Light touch sensation normal in bilateral upper extremities  Bilateral upper trap/levator scapulae, sub-occipital, cervical paraspinal tightness/tenderness noted, more on R  Hypomobility/tenderness noted during PA glides of C4-T2           Assessment & Plan       Assessment  Impairments: abnormal or restricted ROM, activity intolerance, impaired physical strength, lacks appropriate home exercise program and pain with function   Functional limitations: lifting, sleeping, pulling, pushing, uncomfortable because of pain, sitting and unable to perform repetitive tasks   Assessment details: Patient presents with signs/symptoms consistent with cervical pain without radiculopathy and demonstrates decreased cervical ROM, cervical spine hypomobility, bilateral shoulder and scapular weakness, cervical muscular tightness, poor posture and reports pain limiting function during ADLs as shown on the NDI. Patient would benefit from skilled PT services in order to address deficits limiting function at this time.  HEP was given to patient this session and education on HEP/diagnosis/proper posture provided to patient.        Goals  Plan Goals: 1. The patient has limited ROM.    LTG 1: 12 weeks:  The patient will demonstrate 75° of bilateral cervical spine rotation,  30° of cervical flexion, and 30° of cervical extension in order to adequately monitor blind spots while driving and improve ability to perform activities of daily living.    STATUS:  New  STG 1a: 6 weeks:  The patient will demonstrate 65° of bilateral cervical  spine rotation, 20° of cervical flexion, and 20° of cervical extension in order to adequately monitor blind spots while driving and improve ability to perform activities of daily living.       STATUS:  New     2. The patient has complaints of pain.   LTG 2: 12 weeks:  The patient will report 2/10 pain in order to more easily tolerate activities of daily living and improve sleep quality.    STATUS:  New   STG 2a: 6 weeks:  The patient will report 3/10 pain.    STATUS:  New    3. Carrying, Moving, and Handling Objects Functional Limitation     LTG 3: 12 weeks:  The patient will demonstrate 20% limitation by achieving a score of 10 on the Neck Disability Index.    STATUS:  New   STG 3a: 6 weeks:  The patient will demonstrate 36% limitation by achieving a score of 18 on the Neck Disability Index.      STATUS:  New      Plan  Therapy options: will be seen for skilled therapy services  Planned modality interventions: TENS, thermotherapy (hydrocollator packs) and cryotherapy  Planned therapy interventions: manual therapy, ADL retraining, neuromuscular re-education, body mechanics training, postural training, soft tissue mobilization, flexibility, spinal/joint mobilization, functional ROM exercises, strengthening, stretching, home exercise program, therapeutic activities and joint mobilization  Frequency: 2x week  Duration in weeks: 12  Treatment plan discussed with: patient        Timed:         Manual Therapy:    8     mins  08231;     Therapeutic Exercise:    8     mins  86851;     Neuromuscular Figueroa:    0    mins  18580;    Therapeutic Activity:     8     mins  48404;     Gait Trainin     mins  11260;     Ultrasound:     0     mins  71257;    Ionto                               0    mins   98414  Self-care  __0__ mins 22226    Un-Timed:  Electrical Stimulation:    0     mins  84625 ( );  Traction     0     mins 10699  Low Eval     20     Mins  77703  Mod Eval     0     Mins  08067  High Eval                        0     Mins  36604  Hot pack     0     Mins    Cold pack                       0     Mins      Timed Treatment:   24   mins   Total Treatment:     44   mins    PT SIGNATURE: Mara Worrell PT      Electronically signed 5/14/2025  [place] License: [id number]    Initial Certification    Certification Period: 5/14/2025 thru 8/11/2025  NPI: [id number]  I certify that the therapy services are furnished while this patient is under my care.  The services outlined above are required by this patient, and will be reviewed every 90 days.     PHYSICIAN: Joe Zhou MD      DATE:     Please sign and return via fax to [phone].. Thank you, [facility].

## 2025-05-21 DIAGNOSIS — F11.21 OPIOID DEPENDENCE IN REMISSION: ICD-10-CM

## 2025-06-05 ENCOUNTER — OFFICE VISIT (OUTPATIENT)
Age: 50
End: 2025-06-05
Payer: COMMERCIAL

## 2025-06-05 VITALS
DIASTOLIC BLOOD PRESSURE: 88 MMHG | HEIGHT: 65 IN | BODY MASS INDEX: 20.99 KG/M2 | OXYGEN SATURATION: 97 % | SYSTOLIC BLOOD PRESSURE: 110 MMHG | WEIGHT: 126 LBS | HEART RATE: 99 BPM

## 2025-06-05 DIAGNOSIS — M47.812 CERVICAL SPONDYLOSIS: ICD-10-CM

## 2025-06-05 DIAGNOSIS — F11.21 OPIOID DEPENDENCE IN REMISSION: Primary | ICD-10-CM

## 2025-06-05 DIAGNOSIS — J43.1 PANLOBULAR EMPHYSEMA: ICD-10-CM

## 2025-06-05 DIAGNOSIS — F17.200 NICOTINE DEPENDENCE WITH CURRENT USE: ICD-10-CM

## 2025-06-05 RX ORDER — GABAPENTIN 800 MG/1
800 TABLET ORAL 4 TIMES DAILY
Qty: 120 TABLET | Refills: 2 | Status: SHIPPED | OUTPATIENT
Start: 2025-06-05

## 2025-06-05 RX ORDER — BUPRENORPHINE 8 MG/1
16 TABLET SUBLINGUAL DAILY
Qty: 60 TABLET | Refills: 0 | Status: SHIPPED | OUTPATIENT
Start: 2025-06-05

## 2025-06-05 NOTE — PROGRESS NOTES
Office  Note     Patient Name: Magi Neumann  : 1975   MRN: [id number]     Referring Provider: Joe Zhou MD    Chief Complaint: Substance use    History of Present Illness:   HPI    Magi Neumann is a 50 y.o. female who is here today for follow up related to her opioid use disorder.  She uses buprenorphine tablets without naloxone that are 8 mg each she uses 2 a day.  It works well for her as she has had no craving or relapses on opiates over the last month.  And this is in spite of the fact that she has had a significant relapse trigger in the form of her chronic neck pain.  Now at [facility] as her PCP has got her started in physical therapy after getting a neck x-ray.  She is only had 1 physical therapy appointment so far so she has not had any improvement in her neck pain so far.    She she has to sleep only on her back her left side.  If she even rolls over on her right side for a short period she will get more neck and shoulder pain on that side plus numbness all the way down in her hand.  If the physical therapy does not work she will probably need an MRI of her C-spine which she has never had before.      Triggers: Chronic neck pain is as bad as ever and interfering with her sleep sober going to raise her gabapentin by a third to see if that will complement the physical therapy.    Cravings: None    Relapse Prevention: Continue her therapy with Suzie Davis with her next appointment in 6 days.  We will see her back in 30 days.    UDS Confirmation: Negative    LILY (PDMP) Reviewed for Current/Active Medications      Past Surgical History:  Past Surgical History:   Procedure Laterality Date     SECTION      x2    CHOLECYSTECTOMY      D & C WITH SUCTION      x2    PANCREAS SURGERY      TUBAL ABDOMINAL LIGATION         Problem List:  Patient Active Problem List   Diagnosis    Chronic pancreatitis    Panic attack    Anxiety    Hypokalemia    Pancreatitis    Nonspecific  syndrome suggestive of viral illness       Allergy:   Allergies   Allergen Reactions    Buprenorphine Hcl-Naloxone Hcl Rash        Current Medications:   Current Outpatient Medications   Medication Sig Dispense Refill    buprenorphine (SUBUTEX) 8 MG sublingual tablet SL tablet Place 2 tablets under the tongue Daily. 60 tablet 0    gabapentin (NEURONTIN) 800 MG tablet Take 1 tablet by mouth 4 (Four) Times a Day. 120 tablet 2    acetaminophen (TYLENOL) 500 MG tablet Take 1 tablet by mouth Every 6 (Six) Hours As Needed for Mild Pain. 30 tablet 0    albuterol (ACCUNEB) 1.25 MG/3ML nebulizer solution Take 3 mL by nebulization Every 6 (Six) Hours As Needed for Wheezing. 360 mL 5    albuterol sulfate  (90 Base) MCG/ACT inhaler Inhale 2 puffs Every 6 (Six) Hours As Needed for Wheezing. 18 g 1    Creon 50400-91192 units capsule delayed-release particles capsule       ibuprofen (ADVIL,MOTRIN) 800 MG tablet Take 1 tablet by mouth Every 8 (Eight) Hours As Needed for Moderate Pain (Use with food.). 90 tablet 2    potassium chloride (K-DUR,KLOR-CON) 20 MEQ CR tablet Take 1 tablet by mouth Daily.      QUEtiapine (SEROquel) 25 MG tablet Take 1 tablet by mouth every night at bedtime.      Symbicort 160-4.5 MCG/ACT inhaler INHALE TWO PUFFS BY MOUTH TWICE A DAY 10.2 g 5     No current facility-administered medications for this visit.       Past Medical History:  Past Medical History:   Diagnosis Date    Anxiety     Chronic pancreatitis     COPD (chronic obstructive pulmonary disease)     Depression     GERD (gastroesophageal reflux disease)     Substance abuse        Social History:  Social History     Socioeconomic History    Marital status:      Spouse name: CHULA    Number of children: 2    Highest education level: Some college, no degree   Tobacco Use    Smoking status: Every Day     Current packs/day: 0.50     Average packs/day: 0.5 packs/day for 32.6 years (16.3 ttl pk-yrs)     Types: Cigarettes     Start date:  10/20/1992    Smokeless tobacco: Never   Vaping Use    Vaping status: Some Days    Substances: Flavoring    Devices: Disposable   Substance and Sexual Activity    Alcohol use: Never    Drug use: Not Currently    Sexual activity: Defer     Birth control/protection: None       Social History     Social History Narrative    Social History:    Social Support:  AND SON    Residence: living setting HOME with     Current Employment: [employer]    Education: SOME COLLEGE    Learning Disabilities: NONE    Legal history: NONE    Hobbies/interests: [employer]ALS    Yarsani: Zoroastrianism    Exercise: SOME    Dietary issues: YES CHRONIC PANCREATITIS    Sleep issues: SOME BUT IS ON MEDS    Caffeine intake: 2-3 CANS OF SODA     history: NONE         Family History:  Family History   Problem Relation Age of Onset    Diabetes Mother     No Known Problems Father          Subjective      Review of Systems:   Review of Systems    PHQ-9 Score:       SANJAY-7 Score:        Patient History:   The following portions of the patient's history were reviewed and updated as appropriate: allergies, current medications, past family history, past medical history, past social history, past surgical history and problem list.     Social:  Social History     Socioeconomic History    Marital status:      Spouse name: CHULA    Number of children: 2    Highest education level: Some college, no degree   Tobacco Use    Smoking status: Every Day     Current packs/day: 0.50     Average packs/day: 0.5 packs/day for 32.6 years (16.3 ttl pk-yrs)     Types: Cigarettes     Start date: 10/20/1992    Smokeless tobacco: Never   Vaping Use    Vaping status: Some Days    Substances: Flavoring    Devices: Disposable   Substance and Sexual Activity    Alcohol use: Never    Drug use: Not Currently    Sexual activity: Defer     Birth control/protection: None       Medications:     Current Outpatient Medications:     buprenorphine (SUBUTEX) 8 MG  "sublingual tablet SL tablet, Place 2 tablets under the tongue Daily., Disp: 60 tablet, Rfl: 0    gabapentin (NEURONTIN) 800 MG tablet, Take 1 tablet by mouth 4 (Four) Times a Day., Disp: 120 tablet, Rfl: 2    acetaminophen (TYLENOL) 500 MG tablet, Take 1 tablet by mouth Every 6 (Six) Hours As Needed for Mild Pain., Disp: 30 tablet, Rfl: 0    albuterol (ACCUNEB) 1.25 MG/3ML nebulizer solution, Take 3 mL by nebulization Every 6 (Six) Hours As Needed for Wheezing., Disp: 360 mL, Rfl: 5    albuterol sulfate  (90 Base) MCG/ACT inhaler, Inhale 2 puffs Every 6 (Six) Hours As Needed for Wheezing., Disp: 18 g, Rfl: 1    Creon 37289-48800 units capsule delayed-release particles capsule, , Disp: , Rfl:     ibuprofen (ADVIL,MOTRIN) 800 MG tablet, Take 1 tablet by mouth Every 8 (Eight) Hours As Needed for Moderate Pain (Use with food.)., Disp: 90 tablet, Rfl: 2    potassium chloride (K-DUR,KLOR-CON) 20 MEQ CR tablet, Take 1 tablet by mouth Daily., Disp: , Rfl:     QUEtiapine (SEROquel) 25 MG tablet, Take 1 tablet by mouth every night at bedtime., Disp: , Rfl:     Symbicort 160-4.5 MCG/ACT inhaler, INHALE TWO PUFFS BY MOUTH TWICE A DAY, Disp: 10.2 g, Rfl: 5    Objective     Physical Exam:  Physical Exam    Vital Signs:   Vitals:    06/05/25 1407   BP: 110/88   Pulse: 99   SpO2: 97%   Weight: 57.2 kg (126 lb)   Height: 165.1 cm (65\")       Pill count:  (subutex 7 pills)    Body mass index is 20.97 kg/m².     MENTAL STATUS EXAM   General Appearance:  Cleanly groomed and dressed  Eye Contact:  Good eye contact  Attitude:  Cooperative  Motor Activity:  Normal gait, posture  Muscle Strength:  Normal  Speech:  Normal rate, tone, volume  Language:  Spontaneous  Hopelessness:  Denies  Loneliness: Denies  Thought Process:  Logical  Associations/ Thought Content:  No delusions  Hallucinations:  None  Suicidal Ideations:  Not present  Homicidal Ideation:  Not present          Assessment / Plan      Diagnoses and all orders for this " visit:    1. Opioid dependence in remission (Primary)  -     buprenorphine (SUBUTEX) 8 MG sublingual tablet SL tablet; Place 2 tablets under the tongue Daily.  Dispense: 60 tablet; Refill: 0    2. Nicotine dependence with current use    3. Cervical spondylosis  -     gabapentin (NEURONTIN) 800 MG tablet; Take 1 tablet by mouth 4 (Four) Times a Day.  Dispense: 120 tablet; Refill: 2    4. Panlobular emphysema           PLAN:  Safety: No acute safety concerns  Risk Assessment: Risk of self-harm acutely is low. Risk of self-harm chronically is also low, but could be further elevated in the event of treatment noncompliance and/or AODA.    TREATMENT PLAN/GOALS: Continue supportive psychotherapy efforts and medications as indicated. Treatment and medication options discussed during today's visit. Patient acknowledged and verbally consented to continue with current treatment plan and was educated on the importance of compliance with treatment and follow-up appointments.    MEDICATION ISSUES:  LLIY reviewed as expected.  Discussed medication options and treatment plan of prescribed medication as well as the risks, benefits, and side effects including potential falls, possible impaired driving and metabolic adversities among others. Patient is agreeable to call the office with any worsening of symptoms or onset of side effects. Patient is agreeable to call 911 or go to the nearest ER should he/she begin having SI/HI. No medication side effects or related complaints today.     Return in about 1 month (around 7/5/2025).             This document has been electronically signed by Joe Zhou MD  June 5, 2025 14:58 EDT      Part of this note may be an electronic transcription/translation of spoken language to printed text using the Dragon Dictation System.

## 2025-06-09 ENCOUNTER — TELEPHONE (OUTPATIENT)
Dept: ORTHOPEDICS | Facility: OTHER | Age: 50
End: 2025-06-09
Payer: COMMERCIAL

## 2025-06-10 ENCOUNTER — TELEPHONE (OUTPATIENT)
Dept: PHYSICAL THERAPY | Facility: CLINIC | Age: 50
End: 2025-06-10

## 2025-06-10 NOTE — TELEPHONE ENCOUNTER
Caller: Magi Neumann    Relationship: Self         What was the call regarding: [facility],

## 2025-06-11 ENCOUNTER — TELEMEDICINE (OUTPATIENT)
Age: 50
End: 2025-06-11
Payer: COMMERCIAL

## 2025-06-11 DIAGNOSIS — F11.21 OPIOID DEPENDENCE IN REMISSION: Primary | ICD-10-CM

## 2025-06-11 DIAGNOSIS — F17.200 NICOTINE DEPENDENCE WITH CURRENT USE: ICD-10-CM
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
## 2025-06-11 NOTE — TREATMENT PLAN
Multi-Disciplinary Problems (from Behavioral Health Treatment Plan)      Active Problems       Problem: Substance Abuse Issues  Start Date: 06/11/25      Problem Details: The patient self-scales this problem as a 10 with 10 being the worst.    The patient has a substance use disorder, which has significantly affected her relationships, work, social interactions, and emotional well-being. She is currently undergoing medication-assisted treatment (MAT) to aid in her recovery efforts        Goal Priority Start Date Expected End Date End Date    Patient will abstain from mood altering substances. Critical 06/11/25 12/10/25 --    Goal Details: Progress toward goal:  Not appropriate to rate progress toward goal since this is the initial treatment plan.  Individual Therapy:     Activity: Engage in regular individual therapy sessions to address underlying issues contributing to substance use.     Frequency: Monthly sessions for 12 months or while receiving MAT     Measurement: Patient's progress in identifying triggers and developing coping strategies  Relapse Prevention Plan:     Activity: Develop a personalized relapse prevention plan, including identifying high-risk situations and strategies to manage cravings.     Frequency: One-time development with periodic reviews    Measurement: Patient's ability to implement the plan and avoid relapse.     Healthy Lifestyle Changes:     Activity: Encourage healthy lifestyle changes, such as regular exercise, balanced diet, and adequate sleep.     Frequency: Daily practice.     Measurement: Patient's self-reported improvements in physical and mental health.     Medication Management:     Activity: adhere to prescribed medication regimen to support abstinence.     Frequency: As prescribed by a healthcare provider.     Measurement: Patient's adherence to medication and reduction in substance use.         Goal Intervention Frequency Start Date End Date    Provide education to increase  patients understanding of addiction and relapse processes. Q Month 06/11/25 --    Intervention Details: Duration of treatment until discharged.  Educational Sessions:     Conduct weekly educational sessions focused on the nature of addiction, the brain's response to substances, and the psychological and physical aspects of dependency.     Include information on the stages of relapse (emotional, mental, and physical) and strategies to recognize and manage these stages.     Resource Provision:     Provide the patient with educational materials, such as pamphlets, articles, and online resources, that they can review at their own pace.     Encourage the use of recovery apps that offer daily tips, reminders, and support for managing addiction and preventing relapse.      Support Groups:     Integrate individual counseling sessions to discuss personal experiences with addiction and relapse, and develop personalized strategies for prevention.     Recommend participation in support groups where the patient can share experiences and learn from others who have successfully managed their addiction.     Follow-Up and Evaluation:     Schedule regular follow-up appointments to assess the patient's understanding and application of the educational content.     Adjust the educational plan based on the patients progress and feedback to ensure it remains effective and relevant.         Goal Priority Start Date Expected End Date End Date    Patient will develop insight into how to improve their relationships. High 06/11/25 12/10/25 --    Goal Details: Progress toward goal:  Not appropriate to rate progress toward goal since this is the initial treatment plan.  Individual Therapy:     Activity: Engage in therapy sessions to explore past and present relationship patterns and identify areas for improvement.     Frequency: Monthly sessions for 12 Months or while on MAT     Measurement: Patient's ability to articulate insights gained about  their relationship behaviors.     Communication Skills Training:     Activity: Participate in exercises to improve communication skills, including active listening, assertiveness, and empathy.     Frequency: Monthly practice during therapy sessions and daily application.     Measurement: Patient's increased use of effective communication techniques in interactions.     Conflict Resolution Strategies:     Activity: Learn and practice conflict resolution strategies to manage disagreements constructively.     Frequency:Monthly sessions with role-playing scenarios.     Measurement: Patient's ability to apply conflict resolution techniques in real-life situations.     Self-Reflection Exercises:     Activity: Engage in self-reflection exercises, such as journaling, to understand personal contributions to relationship dynamics.     Frequency: Daily journaling for 10 minutes.     Measurement: Patient's self-reported insights and changes in relationship behaviors.     Relationship Building Activities:     Activity: Participate in activities that promote relationship building, such as shared hobbies or quality time with loved ones.     Frequency: Monthly planned activities.     Measurement: Patient's increased engagement in relationship-building activities and improved relationship satisfaction.         Goal Intervention Frequency Start Date End Date    Educate about 12 step recovery programs. Q Month 06/11/25 --    Intervention Details: Duration of treatment until discharged.  Introduction to 12-Step Programs:     Provide an overview of the history, principles, and structure of 12-step recovery programs such as Alcoholics Anonymous (AA) and Narcotics Anonymous (NA).     Explain the significance of each step and how they collectively contribute to the recovery process.     Educational Materials:     Distribute literature, including pamphlets and booklets, that detail the 12 steps and the philosophy behind them.     Recommend  "reading materials such as the \"Big Book\" of AA, which offers insights and personal stories from individuals in recovery.     Interactive Sessions:     Conduct interactive sessions where the patient can discuss each step, ask questions, and share their thoughts.     Use visual aids and multimedia presentations to enhance understanding and retention of the information.     Support Group Participation:     Encourage the patient to attend local 12-step meetings to observe and participate in the group dynamics.     Provide information on meeting schedules, locations, and online options to ensure accessibility.     Personal Reflection and Application:     Integrate individual counseling sessions to help the patient reflect on how the 12 steps can be applied to their own recovery journey.     Develop personalized strategies for incorporating the principles of the 12 steps into daily life.     Follow-Up and Evaluation:     Schedule regular follow-up appointments to assess the patient's understanding and engagement with the 12-step program.     Adjust the educational approach based on the patient's progress and feedback to ensure it remains effective and supportive.         Goal Priority Start Date Expected End Date End Date    Patient will improve positive self regard. Medium 06/11/25 12/10/25 --    Goal Details: Progress toward goal:  Not appropriate to rate progress toward goal since this is the initial treatment plan.  The patient will develop a more positive view of themselves by recognizing and appreciating their strengths and achievements.     Interventions:     Cognitive Behavioral Therapy (CBT):     Activity: Engage in CBT sessions to identify and challenge negative self-talk and cognitive distortions.     Frequency: Monthly sessions for the next year.     Measurement: Patient's ability to reframe negative thoughts into positive affirmations.     Self-Compassion Exercises:     Activity: Practice self-compassion " exercises, such as self-kindness and mindfulness meditation.     Frequency: Daily practice for 10 minutes.     Measurement: Patient's self-reported increase in feelings of self-compassion and reduced self-criticism.     Strengths Identification:     Activity: Create a list of personal strengths and achievements.     Frequency: One-time activity with weekly reviews.     Measurement: Patient's ability to identify and articulate their strengths.     Positive Affirmations:     Activity: Develop and repeat positive affirmations daily.     Frequency: Daily practice, morning and evening.     Measurement: Patient's increased use of positive affirmations in daily life.     Goal Setting:     Activity: Set and work towards small, achievable personal goals.     Frequency: Monthly goal-setting sessions.     Measurement: Patient's progress in achieving set goals and increased self-efficacy.                         Reviewed By       Rosa Davis, [facility] 06/11/25 [id number]                     I have discussed and reviewed this treatment plan with the patient.

## 2025-06-16 ENCOUNTER — TELEPHONE (OUTPATIENT)
Dept: ORTHOPEDICS | Facility: OTHER | Age: 50
End: 2025-06-16
Payer: COMMERCIAL

## 2025-06-17 ENCOUNTER — TREATMENT (OUTPATIENT)
Dept: PHYSICAL THERAPY | Facility: CLINIC | Age: 50
End: 2025-06-17
Payer: COMMERCIAL

## 2025-06-17 DIAGNOSIS — M54.2 PAIN, NECK: ICD-10-CM

## 2025-06-17 DIAGNOSIS — M47.812 SPONDYLOSIS WITHOUT MYELOPATHY OR RADICULOPATHY, CERVICAL REGION: Primary | ICD-10-CM

## 2025-06-17 DIAGNOSIS — M54.2 PAINFUL CERVICAL ROM: ICD-10-CM

## 2025-06-17 DIAGNOSIS — R29.3 POOR POSTURE: ICD-10-CM

## 2025-06-17 NOTE — PROGRESS NOTES
Progress Assessment  [address] [place] [address]        Patient: Magi Neumann   : 1975  Diagnosis/ICD-10 Code:  Spondylosis without myelopathy or radiculopathy, cervical region [M47.812]  Referring practitioner: Joe Zhou MD  Date of Initial Visit: Type: THERAPY  Noted: 2025  Today's Date: 2025  Patient seen for 2 sessions      Subjective:   Magi Neumann reports: 8/10  Subjective Questionnaire: NDI:  Clinical Progress: unchanged  Home Program Compliance: No  Treatment has included: therapeutic exercise, neuromuscular re-education, manual therapy, and therapeutic activity    Subjective     Pt presents to PT today for re-evaluation. Pt has not been to PT since her initial evaluation. Two appointments were cancelled due to provider, but remainder where cancelled/no show by patient due to family emergency and sickness. Pt reports she is having more pain today due to not sleeping well last night. Pt reports she performed her exercises a few times since initial evaluation one month ago.     Objective     Active Range of Motion   Cervical/Thoracic Spine   Cervical     Flexion: 35 degrees with pain  Extension: 30 degrees   Left rotation: 50 degrees WFL  Right rotation: 40 degrees   L SB: 15 degrees  R SB: 8 degrees     Strength/Myotome Testing      Left Shoulder      Planes of Motion   Flexion: 4  Abduction: 4  ER: 4-     Right Shoulder      Planes of Motion   Flexion: 4  Abduction: 4  ER: 4-     Left Elbow   Flexion: 4  Extension: 4-     Right Elbow   Flexion: 4  Extension: 4-     Left Wrist/Hand   Wrist extension: 5  Wrist flexion: 5     Right Wrist/Hand   Wrist extension: 5  Wrist flexion: 5      Assessment/Plan    Pt made progress with improving cervical flexion and extension since IE, but decrease in rotation. Pt also has had a decrease in strength since IE. Pt has not been able to attend PT since IE and has not been compliant with HEP. Discussed with patient the importance  of performing HEP at home to improve mobility and printed another printout of exercises. Will extend patient's appointments due to patient requiring skilled PT to address deficits. Treatment limited today due to patient being late for appointment. Will continue to progress patient as able.     Goals  Plan Goals: 1. The patient has limited ROM.               LTG 1: 12 weeks:  The patient will demonstrate 75° of bilateral cervical spine rotation,  30° of cervical flexion, and 30° of cervical extension in order to adequately monitor blind spots while driving and improve ability to perform activities of daily living.                          STATUS:  IN PROGRESS  STG 1a: 6 weeks:  The patient will demonstrate 65° of bilateral cervical spine rotation, 20° of cervical flexion, and 20° of cervical extension in order to adequately monitor blind spots while driving and improve ability to perform activities of daily living.                                      STATUS:  IN PROGRESS                2. The patient has complaints of pain.              LTG 2: 12 weeks:  The patient will report 2/10 pain in order to more easily tolerate activities of daily living and improve sleep quality.                          STATUS:  IN PROGRESS              STG 2a: 6 weeks:  The patient will report 3/10 pain.                          STATUS:  IN PROGRESS     3. Carrying, Moving, and Handling Objects Functional Limitation                               LTG 3: 12 weeks:  The patient will demonstrate 20% limitation by achieving a score of 10 on the Neck Disability Index.                          STATUS:  IN PROGRESS              STG 3a: 6 weeks:  The patient will demonstrate 36% limitation by achieving a score of 18 on the Neck Disability Index.                            STATUS:  IN PROGRESS      4. The patient has limited strength of the bilateral shoulder.    LTG 4: 12 weeks: The patient will demonstrate 5/5 strength for bilateral shoulder  flexion, abduction, external rotation, and internal rotation in order to demonstrate improved shoulder stability.    STATUS: New      STG 4: 6 weeks: The patient will demonstrate 4+/5 strength for bilateral shoulder flexion, abduction, external rotation, and internal rotation.    STATUS: New       Progress toward previous goals: Not Met    See Exercise, Manual, and Modality Logs for complete treatment.         Recommendations: Continue as planned  Timeframe: 6 weeks  Prognosis to achieve goals: good    PT SIGNATURE: Electronically signed by Trevor Santos, PT  [place] LICENSE: [id number]    Based upon review of the patient's progress and continued therapy plan, it is my medical opinion that Magi Neumann should continue physical therapy treatment at [facility]  [address] [place] [id number] [phone].      Timed:                 Manual Therapy:    0     mins  46819;     Therapeutic Exercise:    10     mins  44455;     Neuromuscular Figueroa:    0    mins  99726;    Therapeutic Activity:     0     mins  67268;     Gait Trainin     mins  39285;     Ultrasound:     0     mins  47528;    Ionto                               0    mins   31547  Self pay                         0     mins [id number]    Un-Timed:  Electrical Stimulation:    0     mins  80066 ( )  Canalith Repos    0     mins 04970  Dry Needling     0     mins self-pay  Traction     0     mins 14359  Re-evaluation     8     mins 12819    Timed Treatment:   10   mins   Total Treatment:     18   mins      I certify that the therapy services are furnished while this patient is under my care.  The services outlined above are required by this patient, and will be reviewed every 90 days.

## 2025-06-23 ENCOUNTER — TREATMENT (OUTPATIENT)
Dept: PHYSICAL THERAPY | Facility: CLINIC | Age: 50
End: 2025-06-23
Payer: COMMERCIAL

## 2025-06-23 DIAGNOSIS — R29.3 POOR POSTURE: ICD-10-CM

## 2025-06-23 DIAGNOSIS — M54.2 PAINFUL CERVICAL ROM: ICD-10-CM

## 2025-06-23 DIAGNOSIS — M54.2 PAIN, NECK: ICD-10-CM

## 2025-06-23 DIAGNOSIS — M47.812 SPONDYLOSIS WITHOUT MYELOPATHY OR RADICULOPATHY, CERVICAL REGION: Primary | ICD-10-CM

## 2025-06-23 PROCEDURE — 97530 THERAPEUTIC ACTIVITIES: CPT | Performed by: PHYSICAL THERAPIST

## 2025-06-23 PROCEDURE — 97112 NEUROMUSCULAR REEDUCATION: CPT | Performed by: PHYSICAL THERAPIST

## 2025-06-23 PROCEDURE — 97110 THERAPEUTIC EXERCISES: CPT | Performed by: PHYSICAL THERAPIST

## 2025-06-23 PROCEDURE — 97140 MANUAL THERAPY 1/> REGIONS: CPT | Performed by: PHYSICAL THERAPIST

## 2025-06-23 NOTE — PROGRESS NOTES
Physical Therapy Daily Treatment Note  [address] [place] [address]        Patient: Magi Neumann   : 1975  Diagnosis/ICD-10 Code:  Spondylosis without myelopathy or radiculopathy, cervical region [M47.812]  Referring practitioner: Joe Zhou MD  Date of Initial Visit: Type: THERAPY  Noted: 2025  Today's Date: 2025  Patient seen for 3 sessions             Subjective   Magi Neumann reports having 6/10 pain in her right greater than left neck and shoulder upon arrival today.  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      Objective     Verbal/Tactile cues given to provide Neuromuscular Re-education and provide feedback to facilitate improved scapular activation and to decrease compensatory techniques during exercise and functional activity performance.    + Trigger Points and Muscular tightness noted along Cervical spinal musculature, Suboccipitals, and Upper trapezius Right greater than Left.      Poor Scapular musculature strength/stability.    See Exercise and Manual Logs for complete treatment.       Assessment/Plan    Pt tolerated therapy session well - with progression of therapeutic exercises, CKC-Functional activities, and Manual therapy. She has improved, but continues to have deficits in Her Cervical/Scapular ROM,  Strength, and Stability; limiting function and ability to perform all ADL and work activities without increased pain  at this time.  Frequent Verbal and Tactile cues given to provide Neuromuscular Re-education and give feedback to improve posture and form during exercise and functional activity performance.  Positive response to trial of Manual therapy and with use of Theracane in clinic.  Pt reported having less pain and tightness post session today.    Progress per Plan of Care-  as tolerated.           Timed:  Manual Therapy:    10     mins  52538;  Therapeutic Exercise:    10     mins  44245;      Neuromuscular Figueroa:    8    mins  46639;    Therapeutic Activity:     10     mins  71383;     Gait Trainin     mins  89037;     Ultrasound:     0     mins  77631;    Electrical Stimulation:    0     mins  70541;  Iontophoresis     0     mins  33761    Untimed:  Electrical Stimulation:    0     mins  80799 ( );  Mechanical Traction:    0     mins  38060;   Fluidotherapy     0     mins  19951  Hot pack     0     mins  17385  Cold pack     0     mins  06685    Timed Treatment:   38   mins   Total Treatment:     38   mins        Itzel Tejada PTA  Physical Therapist Assistant

## 2025-07-01 RX ORDER — QUETIAPINE FUMARATE 25 MG/1
25 TABLET, FILM COATED ORAL
Qty: 30 TABLET | Refills: 0 | Status: SHIPPED | OUTPATIENT
Start: 2025-07-01

## 2025-07-03 ENCOUNTER — TELEMEDICINE (OUTPATIENT)
Age: 50
End: 2025-07-03
Payer: COMMERCIAL

## 2025-07-03 DIAGNOSIS — J43.1 PANLOBULAR EMPHYSEMA: ICD-10-CM

## 2025-07-03 DIAGNOSIS — F11.21 OPIOID DEPENDENCE IN REMISSION: Primary | ICD-10-CM

## 2025-07-03 DIAGNOSIS — M47.812 CERVICAL SPONDYLOSIS: ICD-10-CM

## 2025-07-03 DIAGNOSIS — F17.200 NICOTINE DEPENDENCE WITH CURRENT USE: ICD-10-CM

## 2025-07-03 RX ORDER — BUPRENORPHINE 8 MG/1
16 TABLET SUBLINGUAL DAILY
Qty: 60 TABLET | Refills: 0 | Status: SHIPPED | OUTPATIENT
Start: 2025-07-03

## 2025-07-03 NOTE — PROGRESS NOTES
Telehealth Visit      This provider is located at [place], [place], using a secure Audio and Video      Telehealth Visit      This provider is located at [place], [place], using a secure Audio and Video      Office  Note     Patient Name: Magi Neumann  : 1975   MRN: [id number]     Referring Provider: Joe Zhou MD    Chief Complaint: Substance use    History of Present Illness:   HPI    Magi Neumann is a 50 y.o. female who is here today for follow up related to her opiate use disorder.  She is compliant on her Subutex of which she takes two 8 mg tablets once a day every day.  They worked very well where she has no opiate withdrawal symptoms physically and consequently virtually no cravings for opiates.  She has not relapsed on any controlled substances.  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    She is most agitated right now about her sister in a nursing home who has an opiate addiction just like the patient does.  The patient went in there because her diabetes was so out of control and she had had postoperative surgical complications regarding poor wound healing because her blood sugar was so high.  The patient's sister then proceeded to complain of a variety of fictional chronic pain syndromes for which the nursing home put her on a fentanyl patch every 3 days plus oxycodone 15 mg tablet to use as needed.  The patient recently came for a day visits to the patient's home after being transported by the sister's son.  The patient's sister proceeded pull a handful of pills out of her pocket and asked the patient if she wanted any because the oxycodone 15 mg were just the pills she always liked in the past.  The patient of course took umbrage at the ill-conceived offer since she has been sober for years.  The patient then complained to the management of the nursing home daily for 2 weeks and  then got banned from coming in the facility when she was just trying to help save her sister's life since the sister actually does not have any significant chronic pain syndromes unless she is perhaps developed painful peripheral neuropathy from her diabetes in the recent period of time.      Triggers: The emotional discord regarding her sister has not triggered any craving for opiates much less any relapses sold that indicates the patient's recovery skills are substantial and helping her get stronger in her recovery.    Cravings: No significant cravings.    Relapse Prevention: The patient sees her therapist Suzie Iniguez in 6 days and we will make her another MD appointment in 1 month.    UDS Confirmation: Negative    LILY (PDMP) Reviewed for Current/Active Medications      Past Surgical History:  Past Surgical History:   Procedure Laterality Date     SECTION      x2    CHOLECYSTECTOMY      D & C WITH SUCTION      x2    PANCREAS SURGERY      TUBAL ABDOMINAL LIGATION         Problem List:  Patient Active Problem List   Diagnosis    Chronic pancreatitis    Panic attack    Anxiety    Hypokalemia    Pancreatitis    Nonspecific syndrome suggestive of viral illness       Allergy:   Allergies   Allergen Reactions    Buprenorphine Hcl-Naloxone Hcl Rash        Current Medications:   Current Outpatient Medications   Medication Sig Dispense Refill    buprenorphine (SUBUTEX) 8 MG sublingual tablet SL tablet Place 2 tablets under the tongue Daily. 60 tablet 0    QUEtiapine (SEROquel) 25 MG tablet Take 1 tablet by mouth every night at bedtime. 30 tablet 0    acetaminophen (TYLENOL) 500 MG tablet Take 1 tablet by mouth Every 6 (Six) Hours As Needed for Mild Pain. 30 tablet 0    albuterol (ACCUNEB) 1.25 MG/3ML nebulizer solution Take 3 mL by nebulization Every 6 (Six) Hours As Needed for Wheezing. 360 mL 5    albuterol sulfate  (90 Base) MCG/ACT inhaler Inhale 2 puffs Every 6 (Six) Hours As Needed for Wheezing. 18 g  1    Creon 05814-39394 units capsule delayed-release particles capsule       gabapentin (NEURONTIN) 800 MG tablet Take 1 tablet by mouth 4 (Four) Times a Day. 120 tablet 2    ibuprofen (ADVIL,MOTRIN) 800 MG tablet Take 1 tablet by mouth Every 8 (Eight) Hours As Needed for Moderate Pain (Use with food.). 90 tablet 2    potassium chloride (K-DUR,KLOR-CON) 20 MEQ CR tablet Take 1 tablet by mouth Daily.      Symbicort 160-4.5 MCG/ACT inhaler INHALE TWO PUFFS BY MOUTH TWICE A DAY 10.2 g 5     No current facility-administered medications for this visit.       Past Medical History:  Past Medical History:   Diagnosis Date    Anxiety     Chronic pancreatitis     COPD (chronic obstructive pulmonary disease)     Depression     GERD (gastroesophageal reflux disease)     Substance abuse        Social History:  Social History     Socioeconomic History    Marital status:      Spouse name: CHULA    Number of children: 2    Highest education level: Some college, no degree   Tobacco Use    Smoking status: Every Day     Current packs/day: 0.50     Average packs/day: 0.5 packs/day for 32.7 years (16.4 ttl pk-yrs)     Types: Cigarettes     Start date: 10/20/1992    Smokeless tobacco: Never   Vaping Use    Vaping status: Some Days    Substances: Flavoring    Devices: Disposable   Substance and Sexual Activity    Alcohol use: Never    Drug use: Not Currently    Sexual activity: Defer     Birth control/protection: None       Social History     Social History Narrative    Social History:    Social Support:  AND SON    Residence: living setting HOME with     Current Employment: [employer]    Education: SOME COLLEGE    Learning Disabilities: NONE    Legal history: NONE    Hobbies/interests: [employer]ALS    Judaism: Congregation    Exercise: SOME    Dietary issues: YES CHRONIC PANCREATITIS    Sleep issues: SOME BUT IS ON MEDS    Caffeine intake: 2-3 CANS OF SODA     history: NONE         Family History:  Family  History   Problem Relation Age of Onset    Diabetes Mother     No Known Problems Father          Subjective      Review of Systems:   Review of Systems    PHQ-9 Score:       SANJAY-7 Score:        Patient History:   The following portions of the patient's history were reviewed and updated as appropriate: allergies, current medications, past family history, past medical history, past social history, past surgical history and problem list.     Social:  Social History     Socioeconomic History    Marital status:      Spouse name: CHULA    Number of children: 2    Highest education level: Some college, no degree   Tobacco Use    Smoking status: Every Day     Current packs/day: 0.50     Average packs/day: 0.5 packs/day for 32.7 years (16.4 ttl pk-yrs)     Types: Cigarettes     Start date: 10/20/1992    Smokeless tobacco: Never   Vaping Use    Vaping status: Some Days    Substances: Flavoring    Devices: Disposable   Substance and Sexual Activity    Alcohol use: Never    Drug use: Not Currently    Sexual activity: Defer     Birth control/protection: None       Medications:     Current Outpatient Medications:     buprenorphine (SUBUTEX) 8 MG sublingual tablet SL tablet, Place 2 tablets under the tongue Daily., Disp: 60 tablet, Rfl: 0    QUEtiapine (SEROquel) 25 MG tablet, Take 1 tablet by mouth every night at bedtime., Disp: 30 tablet, Rfl: 0    acetaminophen (TYLENOL) 500 MG tablet, Take 1 tablet by mouth Every 6 (Six) Hours As Needed for Mild Pain., Disp: 30 tablet, Rfl: 0    albuterol (ACCUNEB) 1.25 MG/3ML nebulizer solution, Take 3 mL by nebulization Every 6 (Six) Hours As Needed for Wheezing., Disp: 360 mL, Rfl: 5    albuterol sulfate  (90 Base) MCG/ACT inhaler, Inhale 2 puffs Every 6 (Six) Hours As Needed for Wheezing., Disp: 18 g, Rfl: 1    Creon 69141-24351 units capsule delayed-release particles capsule, , Disp: , Rfl:     gabapentin (NEURONTIN) 800 MG tablet, Take 1 tablet by mouth 4 (Four) Times a  Day., Disp: 120 tablet, Rfl: 2    ibuprofen (ADVIL,MOTRIN) 800 MG tablet, Take 1 tablet by mouth Every 8 (Eight) Hours As Needed for Moderate Pain (Use with food.)., Disp: 90 tablet, Rfl: 2    potassium chloride (K-DUR,KLOR-CON) 20 MEQ CR tablet, Take 1 tablet by mouth Daily., Disp: , Rfl:     Symbicort 160-4.5 MCG/ACT inhaler, INHALE TWO PUFFS BY MOUTH TWICE A DAY, Disp: 10.2 g, Rfl: 5    Objective     Physical Exam:  Physical Exam    Vital Signs:   There were no vitals filed for this visit.         There is no height or weight on file to calculate BMI.     MENTAL STATUS EXAM   General Appearance:  Cleanly groomed and dressed  Eye Contact:  Good eye contact  Motor Activity:  Normal gait, posture  Muscle Strength:  Normal  Speech:  Normal rate, tone, volume  Language:  Spontaneous  Mood and affect:  Normal, pleasant  Hopelessness:  Denies  Loneliness: Denies  Thought Process:  Logical  Associations/ Thought Content:  No delusions  Hallucinations:  None  Suicidal Ideations:  Not present  Homicidal Ideation:  Not present  Sensorium:  Alert          Assessment / Plan      Diagnoses and all orders for this visit:    1. Opioid dependence in remission (Primary)  -     buprenorphine (SUBUTEX) 8 MG sublingual tablet SL tablet; Place 2 tablets under the tongue Daily.  Dispense: 60 tablet; Refill: 0    2. Nicotine dependence with current use    3. Cervical spondylosis    4. Panlobular emphysema           PLAN:  Safety: No acute safety concerns  Risk Assessment: Risk of self-harm acutely is low. Risk of self-harm chronically is also low, but could be further elevated in the event of treatment noncompliance and/or AODA.    TREATMENT PLAN/GOALS: Continue supportive psychotherapy efforts and medications as indicated. Treatment and medication options discussed during today's visit. Patient acknowledged and verbally consented to continue with current treatment plan and was educated on the importance of compliance with treatment and  follow-up appointments.    MEDICATION ISSUES:  LILY reviewed as expected.  Discussed medication options and treatment plan of prescribed medication as well as the risks, benefits, and side effects including potential falls, possible impaired driving and metabolic adversities among others. Patient is agreeable to call the office with any worsening of symptoms or onset of side effects. Patient is agreeable to call 911 or go to the nearest ER should he/she begin having SI/HI. No medication side effects or related complaints today.     Return in about 1 month (around 8/3/2025).             This document has been electronically signed by Joe Zhou MD  July 3, 2025 15:30 EDT      Part of this note may be an electronic transcription/translation of spoken language to printed text using the Dragon Dictation System. connection. Patient is being seen remotely via telehealth at their home address in [place], and stated they are in a secure environment for this session. The patient's condition being diagnosed/treated is appropriate for telemedicine. The provider identified himself as well as his credentials. The patient, and/or patients guardian, consent to be seen remotely, and when consent is given they understand that the consent allows for patient identifiable information to be sent to a third party as needed. They may refuse to be seen remotely at any time. The electronic data is encrypted and password protected, and the patient and/or guardian has been advised of the potential risks to privacy not withstanding such measures. connection. Patient is being seen remotely via telehealth at their home address in [place], and stated they are in a secure environment for this session. The patient's condition being diagnosed/treated is appropriate for telemedicine. The provider identified himself as well as his credentials. The patient, and/or patients guardian, consent to be seen remotely, and when consent is given they  understand that the consent allows for patient identifiable information to be sent to a third party as needed. They may refuse to be seen remotely at any time. The electronic data is encrypted and password protected, and the patient and/or guardian has been advised of the potential risks to privacy not withstanding such measures.

## 2025-07-09 ENCOUNTER — TELEMEDICINE (OUTPATIENT)
Age: 50
End: 2025-07-09
Payer: COMMERCIAL

## 2025-07-09 DIAGNOSIS — F17.200 NICOTINE DEPENDENCE WITH CURRENT USE: ICD-10-CM

## 2025-07-09 DIAGNOSIS — F11.21 OPIOID DEPENDENCE IN REMISSION: Primary | ICD-10-CM

## 2025-07-09 NOTE — PROGRESS NOTES
[facility]   [address]  [place] [address]  [phone]     Referring physician/provider: No ref. provider found   PCP: Joe Zhou MD    Telehealth Encounter  Date of service: 7/9/2025    Time in: 2:00 PM  Time Out: 2:45 PM    Chief Complaint  Substance Use    Mode of Visit: Video   Location of patient: -HOME-   Location of provider: [facility]   You have chosen to receive care through a telehealth visit.   The patient has signed the video visit consent form.   The visit included audio and video interaction. No technical issues occurred during this visit.     This provider is located at [facility] using a secure [employer] Video Visit through [employer]. Patient is being seen remotely via telehealth at home address in [place] and stated they are in a secure environment for this session. The patient's condition being diagnosed/treated is appropriate for telemedicine. The provider identified herself as well as her credentials. The patient, and/or patients guardian, consent to be seen remotely, and when consent is given they understand that the consent allows for patient identifiable information to be sent to a third party as needed. They may refuse to be seen remotely at any time. The electronic data is encrypted and password protected, and the patient and/or guardian has been advised of the potential risks to privacy not withstanding such measures.     You have chosen to receive care through a telehealth visit.  Do you consent to use a video/audio connection for your medical care today? [x]  Yes     []  No     Referring Provider: Joe Zhou MD    Progress Note     History of Present Illness  The patient is a 50-year-old female being seen today for a follow-up psychotherapy appointment for her opioid dependence in remission with medication-assisted treatment and her nicotine dependence with current use.    She  Done  Thank you! reports feeling well overall, with no current mental health concerns. She has been under the care of Dr. Chow for an extended period and had a virtual consultation with him last week. She plans to schedule another appointment with him soon.    She is currently dealing with the stress of her sister's addiction issues. Her sister, who was previously living with her, is now in a nursing home due medical complications. Her sister is now on a fentanyl patch and Percocet, which she believes is exacerbating her condition. She has tried to intervene but has been removed from her sister's medical contact list. She feels overwhelmed by the situation and is unsure if she can allow her sister to resume living with her once she leaves the nursing home. She is concerned about the impact of her sister's situation on her own family, as well as her own recovery efforts. She shares that over the weekend her sister in fact came for a visit and offered her pain pills,  which she declined. The patient expresses sadness about her sister's condition, remembering her as a protective figure during their difficult childhood. She also notes that addiction has been a recurring issue in their family, with several siblings having passed away due to substance use disorder.    Substance Use:  - Opioid dependence in remission with medication-assisted treatment  - Nicotine dependence with current use      No diagnosis found.       Clinical Maneuvering/Intervention:   Assisted patient in processing above session content; acknowledged and normalized patient’s thoughts, feelings, and concerns by utilizing a person-centered approach in efforts to build appropriate rapport and a positive therapeutic relationship with open and honest communication. Processed and rationalized patient's thoughts and feelings regarding her sister's addiction and its impact on her own recovery. Discussed triggers associated with patient's stress and family dynamics.Also  discussed coping skills for patient to implement such as: Practicing mindfulness meditation daily to manage stress, engaging in regular physical exercise such as walking or yoga to improve mood, and maintaining a balanced diet for overall well-being. Journaling can help process emotions, while deep breathing exercises can reduce anxiety. Connecting with friends, family, or support groups provides social support, and pursuing hobbies like painting or gardening can bring relaxation. Additionally, participation in 12-step programs like Alcoholics Anonymous (AA) or Narcotics Anonymous (NA) is encouraged, along with obtaining a sponsor for guidance and accountability throughout the recovery journey.    Allowed patient to freely discuss issues without interruption or judgment. Provided safe, confidential environment to facilitate the development of positive therapeutic relationship and encourage open, honest communication. Assisted patient in identifying risk factors which would indicate the need for higher level of care including thoughts to harm self or others and/or self-harming behavior and encouraged patient to contact this office, call 911, or present to the nearest emergency room should any of these events occur. Discussed crisis intervention services and means to access. Patient adamantly and convincingly denies current suicidal or homicidal ideation or perceptual disturbance.    PHQ-Score Total:  PHQ-9 Total Score:      SANJAY-7 Score Total:  SANJAY-7 Score:                  Mental Status Exam:   Hygiene:   good  Cooperation:  Cooperative  Eye Contact:  Good  Psychomotor Behavior:  Appropriate  Affect:  Full range  Mood: normal  Speech:  Normal  Thought Process:  Goal directed  Thought Content:  Normal  Suicidal:  None  Homicidal:  None  Hallucinations:  None  Delusion:  None  Memory:  Intact  Orientation:  Person, Place, Time, and Situation  Reliability:  good  Insight:  Good  Judgement:  Good  Impulse Control:   Good  Physical/Medical Issues:  No      Patient's Support Network Includes:   and children    Functional Status: Mild impairment     Progress toward goal: Not at goal    Prognosis: Good with Ongoing Treatment     Medications:     Current Outpatient Medications:     QUEtiapine (SEROquel) 25 MG tablet, Take 1 tablet by mouth every night at bedtime., Disp: 30 tablet, Rfl: 0    acetaminophen (TYLENOL) 500 MG tablet, Take 1 tablet by mouth Every 6 (Six) Hours As Needed for Mild Pain., Disp: 30 tablet, Rfl: 0    albuterol (ACCUNEB) 1.25 MG/3ML nebulizer solution, Take 3 mL by nebulization Every 6 (Six) Hours As Needed for Wheezing., Disp: 360 mL, Rfl: 5    albuterol sulfate  (90 Base) MCG/ACT inhaler, Inhale 2 puffs Every 6 (Six) Hours As Needed for Wheezing., Disp: 18 g, Rfl: 1    buprenorphine (SUBUTEX) 8 MG sublingual tablet SL tablet, Place 2 tablets under the tongue Daily., Disp: 60 tablet, Rfl: 0    Creon 96113-81338 units capsule delayed-release particles capsule, , Disp: , Rfl:     gabapentin (NEURONTIN) 800 MG tablet, Take 1 tablet by mouth 4 (Four) Times a Day., Disp: 120 tablet, Rfl: 2    ibuprofen (ADVIL,MOTRIN) 800 MG tablet, Take 1 tablet by mouth Every 8 (Eight) Hours As Needed for Moderate Pain (Use with food.)., Disp: 90 tablet, Rfl: 2    potassium chloride (K-DUR,KLOR-CON) 20 MEQ CR tablet, Take 1 tablet by mouth Daily., Disp: , Rfl:     Symbicort 160-4.5 MCG/ACT inhaler, INHALE TWO PUFFS BY MOUTH TWICE A DAY, Disp: 10.2 g, Rfl: 5    Visit Diagnosis/Orders Placed This Visit:  No diagnosis found.       Assessment and Plan   There are no diagnoses linked to this encounter.    Assessment & Plan  Problems:  - Opioid dependence in remission  - Nicotine dependence with current use    Content of Therapy:  During the session, the patient discussed her ongoing struggles with her sister's addiction and the impact it has on her own recovery and family dynamics. Topics included setting boundaries, the  emotional toll of her sister's behavior, and the patient's efforts to support her sister's recovery. The patient also shared her experiences with her sister's manipulation and the stress it causes. The importance of maintaining her own sobriety and the challenges of dealing with her sister's addiction were emphasized.    Clinical Impression:  The patient appears to be maintaining her sobriety and is actively engaged in her recovery process. She is managing her mental health well despite the significant stressors related to her sister's addiction. The patient demonstrates insight into the need to set boundaries to protect her own recovery. There are no new mental health concerns reported, and she appears to be coping effectively with her current situation.    Therapeutic Intervention:  The session focused on cognitive reframing, exploring feelings of guilt and responsibility, and reinforcing the importance of setting healthy boundaries. The clinician provided support and validation for the patient's feelings and decisions regarding her sister. The patient was encouraged to continue prioritizing her own recovery and well-being.    Plan:  - Continue current medication-assisted treatment for opioid dependence.  - Consider smoking cessation programs and strategies to quit nicotine use.  - Maintain regular follow-ups with Dr. Chow.  - Continue current mental health practices and reach out if new concerns arise.    Follow-up:  A follow-up appointment is scheduled for 08/13/2025 at 2:00 PM.          Safety: No acute safety concerns  Risk Assessment: Risk of self-harm acutely is low. Risk of self-harm chronically is also low, but could be further elevated in the event of treatment noncompliance and/or AODA.    Crisis Plan:  Symptoms and/or behaviors to indicate a crisis: Abuse of substances    What calming techniques or other strategies will patient use to de-escalate and stay safe: slow down, breathe, visualize calming  self, think it though, listen to music, change focus, take a walk    Who is one person patient can contact to assist with de-escalation? Maciel Kohler    Treatment Plan/Goals: Patient will continue supportive psychotherapy efforts and medication regimen as prescribed. Therapist will provide Cognitive Behavioral Therapy to assist patient in improving functioning and gaining coping skills, maintaining stability, and avoiding decompensation and the need for higher level of care. Plan for treatment was discussed during today's visit. Patient acknowledged and verbally consented to continue with current treatment plan and was educated on the importance of compliance with treatment and follow-up appointments.     Patient will contact this office, call 911 or present to the nearest emergency room should suicidal or homicidal ideations occur.     Follow Up:   No follow-ups on file.    Patient's questions were answered.  Thank you for allowing me to participate in the care of this patient.  Dictated Utilizing Dragon Dictation. Please note that portions of this note were completed with a voice recognition program. Part of this note may be an electronic transcription/translation of spoken language to printed text using the Dragon Dictation System.      Patient or patient representative verbalized consent for the use of Ambient Listening during the visit with  RHIANNA Gilmore for chart documentation. 7/9/2025  15:00 EDT    [facility]   RHIANNA Gilmore  07/09/25  14:01 EDT

## 2025-07-09 NOTE — TREATMENT PLAN
Multi-Disciplinary Problems (from Behavioral Health Treatment Plan)      Active Problems       Problem: Substance Abuse Issues  Start Date: 06/11/25      Problem Details: The patient self-scales this problem as a 10 with 10 being the worst.    The patient has a substance use disorder, which has significantly affected her relationships, work, social interactions, and emotional well-being. She is currently undergoing medication-assisted treatment (MAT) to aid in her recovery efforts        Goal Priority Start Date Expected End Date End Date    Patient will abstain from mood altering substances. Critical 06/11/25 12/10/25 --    Goal Details: Progress toward goal:  Not appropriate to rate progress toward goal since this is the initial treatment plan.  Individual Therapy:     Activity: Engage in regular individual therapy sessions to address underlying issues contributing to substance use.     Frequency: Monthly sessions for 12 months or while receiving MAT     Measurement: Patient's progress in identifying triggers and developing coping strategies  Relapse Prevention Plan:     Activity: Develop a personalized relapse prevention plan, including identifying high-risk situations and strategies to manage cravings.     Frequency: One-time development with periodic reviews    Measurement: Patient's ability to implement the plan and avoid relapse.     Healthy Lifestyle Changes:     Activity: Encourage healthy lifestyle changes, such as regular exercise, balanced diet, and adequate sleep.     Frequency: Daily practice.     Measurement: Patient's self-reported improvements in physical and mental health.     Medication Management:     Activity: adhere to prescribed medication regimen to support abstinence.     Frequency: As prescribed by a healthcare provider.     Measurement: Patient's adherence to medication and reduction in substance use.         Goal Intervention Frequency Start Date End Date    Provide education to increase  patients understanding of addiction and relapse processes. Q Month 06/11/25 --    Intervention Details: Duration of treatment until discharged.  Educational Sessions:     Conduct weekly educational sessions focused on the nature of addiction, the brain's response to substances, and the psychological and physical aspects of dependency.     Include information on the stages of relapse (emotional, mental, and physical) and strategies to recognize and manage these stages.     Resource Provision:     Provide the patient with educational materials, such as pamphlets, articles, and online resources, that they can review at their own pace.     Encourage the use of recovery apps that offer daily tips, reminders, and support for managing addiction and preventing relapse.      Support Groups:     Integrate individual counseling sessions to discuss personal experiences with addiction and relapse, and develop personalized strategies for prevention.     Recommend participation in support groups where the patient can share experiences and learn from others who have successfully managed their addiction.     Follow-Up and Evaluation:     Schedule regular follow-up appointments to assess the patient's understanding and application of the educational content.     Adjust the educational plan based on the patients progress and feedback to ensure it remains effective and relevant.         Goal Priority Start Date Expected End Date End Date    Patient will develop insight into how to improve their relationships. High 06/11/25 12/10/25 --    Goal Details: Progress toward goal:  Not appropriate to rate progress toward goal since this is the initial treatment plan.  Individual Therapy:     Activity: Engage in therapy sessions to explore past and present relationship patterns and identify areas for improvement.     Frequency: Monthly sessions for 12 Months or while on MAT     Measurement: Patient's ability to articulate insights gained about  their relationship behaviors.     Communication Skills Training:     Activity: Participate in exercises to improve communication skills, including active listening, assertiveness, and empathy.     Frequency: Monthly practice during therapy sessions and daily application.     Measurement: Patient's increased use of effective communication techniques in interactions.     Conflict Resolution Strategies:     Activity: Learn and practice conflict resolution strategies to manage disagreements constructively.     Frequency:Monthly sessions with role-playing scenarios.     Measurement: Patient's ability to apply conflict resolution techniques in real-life situations.     Self-Reflection Exercises:     Activity: Engage in self-reflection exercises, such as journaling, to understand personal contributions to relationship dynamics.     Frequency: Daily journaling for 10 minutes.     Measurement: Patient's self-reported insights and changes in relationship behaviors.     Relationship Building Activities:     Activity: Participate in activities that promote relationship building, such as shared hobbies or quality time with loved ones.     Frequency: Monthly planned activities.     Measurement: Patient's increased engagement in relationship-building activities and improved relationship satisfaction.         Goal Intervention Frequency Start Date End Date    Educate about 12 step recovery programs. Q Month 06/11/25 --    Intervention Details: Duration of treatment until discharged.  Introduction to 12-Step Programs:     Provide an overview of the history, principles, and structure of 12-step recovery programs such as Alcoholics Anonymous (AA) and Narcotics Anonymous (NA).     Explain the significance of each step and how they collectively contribute to the recovery process.     Educational Materials:     Distribute literature, including pamphlets and booklets, that detail the 12 steps and the philosophy behind them.     Recommend  "reading materials such as the \"Big Book\" of AA, which offers insights and personal stories from individuals in recovery.     Interactive Sessions:     Conduct interactive sessions where the patient can discuss each step, ask questions, and share their thoughts.     Use visual aids and multimedia presentations to enhance understanding and retention of the information.     Support Group Participation:     Encourage the patient to attend local 12-step meetings to observe and participate in the group dynamics.     Provide information on meeting schedules, locations, and online options to ensure accessibility.     Personal Reflection and Application:     Integrate individual counseling sessions to help the patient reflect on how the 12 steps can be applied to their own recovery journey.     Develop personalized strategies for incorporating the principles of the 12 steps into daily life.     Follow-Up and Evaluation:     Schedule regular follow-up appointments to assess the patient's understanding and engagement with the 12-step program.     Adjust the educational approach based on the patient's progress and feedback to ensure it remains effective and supportive.         Goal Priority Start Date Expected End Date End Date    Patient will improve positive self regard. Medium 06/11/25 12/10/25 --    Goal Details: Progress toward goal:  Not appropriate to rate progress toward goal since this is the initial treatment plan.  The patient will develop a more positive view of themselves by recognizing and appreciating their strengths and achievements.     Interventions:     Cognitive Behavioral Therapy (CBT):     Activity: Engage in CBT sessions to identify and challenge negative self-talk and cognitive distortions.     Frequency: Monthly sessions for the next year.     Measurement: Patient's ability to reframe negative thoughts into positive affirmations.     Self-Compassion Exercises:     Activity: Practice self-compassion " exercises, such as self-kindness and mindfulness meditation.     Frequency: Daily practice for 10 minutes.     Measurement: Patient's self-reported increase in feelings of self-compassion and reduced self-criticism.     Strengths Identification:     Activity: Create a list of personal strengths and achievements.     Frequency: One-time activity with weekly reviews.     Measurement: Patient's ability to identify and articulate their strengths.     Positive Affirmations:     Activity: Develop and repeat positive affirmations daily.     Frequency: Daily practice, morning and evening.     Measurement: Patient's increased use of positive affirmations in daily life.     Goal Setting:     Activity: Set and work towards small, achievable personal goals.     Frequency: Monthly goal-setting sessions.     Measurement: Patient's progress in achieving set goals and increased self-efficacy.                         Reviewed By       Rosa Davis LPCC 07/09/25 1405    Rosa Davis LPCC 07/09/25 1405                     I have discussed and reviewed this treatment plan with the patient.

## 2025-07-18 ENCOUNTER — TELEPHONE (OUTPATIENT)
Dept: PHYSICAL THERAPY | Facility: CLINIC | Age: 50
End: 2025-07-18

## 2025-07-18 NOTE — TELEPHONE ENCOUNTER
Caller: Magi Neumann    Relationship: Self      What was the call regarding: UNABLE TO MAKE APPOINTMENT.

## 2025-07-31 ENCOUNTER — TELEMEDICINE (OUTPATIENT)
Age: 50
End: 2025-07-31
Payer: COMMERCIAL

## 2025-07-31 DIAGNOSIS — F11.21 OPIOID DEPENDENCE IN REMISSION: Primary | ICD-10-CM

## 2025-07-31 DIAGNOSIS — J43.1 PANLOBULAR EMPHYSEMA: ICD-10-CM

## 2025-07-31 DIAGNOSIS — M47.812 CERVICAL SPONDYLOSIS: ICD-10-CM

## 2025-07-31 RX ORDER — BUPRENORPHINE 8 MG/1
16 TABLET SUBLINGUAL DAILY
Qty: 60 TABLET | Refills: 0 | Status: SHIPPED | OUTPATIENT
Start: 2025-07-31

## 2025-07-31 RX ORDER — IBUPROFEN 800 MG/1
800 TABLET, FILM COATED ORAL EVERY 8 HOURS PRN
Qty: 90 TABLET | Refills: 2 | Status: SHIPPED | OUTPATIENT
Start: 2025-07-31 | End: 2026-07-31

## 2025-07-31 RX ORDER — QUETIAPINE FUMARATE 25 MG/1
25 TABLET, FILM COATED ORAL
Qty: 30 TABLET | Refills: 0 | Status: SHIPPED | OUTPATIENT
Start: 2025-07-31

## 2025-07-31 NOTE — PROGRESS NOTES
Telehealth Visit      This provider is located at [place], [place], using a secure Audio and Video  connection. Patient is being seen remotely via telehealth at their work address in [place], and stated they are in a secure environment for this session. The patient's condition being diagnosed/treated is appropriate for telemedicine. The provider identified himself as well as his credentials. The patient, and/or patients guardian, consent to be seen remotely, and when consent is given they understand that the consent allows for patient identifiable information to be sent to a third party as needed. They may refuse to be seen remotely at any time. The electronic data is encrypted and password protected, and the patient and/or guardian has been advised of the potential risks to privacy not withstanding such measures.     Office  Note     Patient Name: Magi Neumann  : 1975   MRN: [id number]     Referring Provider: Joe Zhou MD    Chief Complaint: Substance use    History of Present Illness:   HPI    Magi Neumann is a 50 y.o. female who is here today for follow up related to related to her opiate use disorder.  The patient uses her to Subutex tablets at 8 mg each she takes 1 at 5 AM and the second 1 at 1 PM 8 hours later to help her with her chronic pain management.  This dose of buprenorphine prevents any opiate withdrawal symptoms but she does have occasional craving when her neck pain gets worse by that evening.    She has had no relapses on opiates and the cravings are fleeting but the pain does cause her to have a compromise quality of life.  She is she also takes Motrin 800 mg 3 times a day and gabapentin 800 mg 4 times a day.  She has never been to a pain doctor for injections.  She has never had an injection for her cervical spondylosis so we are going to refer her to [facility] [place] in [facility] just for an evaluation to see if she would benefit and receive  injections there.  She does not need any other adjustments in her medication at this time.    She does need a refill of her Motrin with her Subutex today.  She is also using her quetiapine 25 mg to help her get to sleep every night so she needs that medication refilled also.  She is using heat on her neck which is helpful every evening to reduce her stiffness and pain      Triggers: Triggers are her chronic intermittent neck pain.    Cravings: She has fleeting cravings just 5 to 10 seconds where she wished she had some pain relief medicine like an opiate even though she is not trying to get high on it.    Relapse Prevention: She has got a therapy appointment scheduled with her therapist here in 2 weeks.    UDS Confirmation: UDS negative    LILY (PDMP) Reviewed for Current/Active Medications      Past Surgical History:  Past Surgical History:   Procedure Laterality Date     SECTION      x2    CHOLECYSTECTOMY      D & C WITH SUCTION      x2    PANCREAS SURGERY      TUBAL ABDOMINAL LIGATION         Problem List:  Patient Active Problem List   Diagnosis    Chronic pancreatitis    Panic attack    Anxiety    Hypokalemia    Pancreatitis    Nonspecific syndrome suggestive of viral illness       Allergy:   Allergies   Allergen Reactions    Buprenorphine Hcl-Naloxone Hcl Rash        Current Medications:   Current Outpatient Medications   Medication Sig Dispense Refill    buprenorphine (SUBUTEX) 8 MG sublingual tablet SL tablet Place 2 tablets under the tongue Daily. 60 tablet 0    ibuprofen (ADVIL,MOTRIN) 800 MG tablet Take 1 tablet by mouth Every 8 (Eight) Hours As Needed for Moderate Pain (Use with food.). 90 tablet 2    QUEtiapine (SEROquel) 25 MG tablet Take 1 tablet by mouth every night at bedtime. 30 tablet 0    acetaminophen (TYLENOL) 500 MG tablet Take 1 tablet by mouth Every 6 (Six) Hours As Needed for Mild Pain. 30 tablet 0    albuterol (ACCUNEB) 1.25 MG/3ML nebulizer solution Take 3 mL by nebulization  Every 6 (Six) Hours As Needed for Wheezing. 360 mL 5    albuterol sulfate  (90 Base) MCG/ACT inhaler Inhale 2 puffs Every 6 (Six) Hours As Needed for Wheezing. 18 g 1    Creon 82889-24784 units capsule delayed-release particles capsule       gabapentin (NEURONTIN) 800 MG tablet Take 1 tablet by mouth 4 (Four) Times a Day. 120 tablet 2    potassium chloride (K-DUR,KLOR-CON) 20 MEQ CR tablet Take 1 tablet by mouth Daily.      Symbicort 160-4.5 MCG/ACT inhaler INHALE TWO PUFFS BY MOUTH TWICE A DAY 10.2 g 5     No current facility-administered medications for this visit.       Past Medical History:  Past Medical History:   Diagnosis Date    Anxiety     Chronic pancreatitis     COPD (chronic obstructive pulmonary disease)     Depression     GERD (gastroesophageal reflux disease)     Substance abuse        Social History:  Social History     Socioeconomic History    Marital status:      Spouse name: CHULA    Number of children: 2    Highest education level: Some college, no degree   Tobacco Use    Smoking status: Every Day     Current packs/day: 0.50     Average packs/day: 0.5 packs/day for 32.8 years (16.4 ttl pk-yrs)     Types: Cigarettes     Start date: 10/20/1992    Smokeless tobacco: Never   Vaping Use    Vaping status: Some Days    Substances: Flavoring    Devices: Disposable   Substance and Sexual Activity    Alcohol use: Never    Drug use: Not Currently    Sexual activity: Defer     Birth control/protection: None       Social History     Social History Narrative    Social History:    Social Support:  AND SON    Residence: living setting HOME with     Current Employment: [employer]    Education: SOME COLLEGE    Learning Disabilities: NONE    Legal history: NONE    Hobbies/interests: [employer]ALS    Episcopalian: Zoroastrian    Exercise: SOME    Dietary issues: YES CHRONIC PANCREATITIS    Sleep issues: SOME BUT IS ON MEDS    Caffeine intake: 2-3 CANS OF SODA     history: NONE          Family History:  Family History   Problem Relation Age of Onset    Diabetes Mother     No Known Problems Father          Subjective      Review of Systems:   Review of Systems    PHQ-9 Score:       SANJAY-7 Score:        Patient History:   The following portions of the patient's history were reviewed and updated as appropriate: allergies, current medications, past family history, past medical history, past social history, past surgical history and problem list.     Social:  Social History     Socioeconomic History    Marital status:      Spouse name: CHULA    Number of children: 2    Highest education level: Some college, no degree   Tobacco Use    Smoking status: Every Day     Current packs/day: 0.50     Average packs/day: 0.5 packs/day for 32.8 years (16.4 ttl pk-yrs)     Types: Cigarettes     Start date: 10/20/1992    Smokeless tobacco: Never   Vaping Use    Vaping status: Some Days    Substances: Flavoring    Devices: Disposable   Substance and Sexual Activity    Alcohol use: Never    Drug use: Not Currently    Sexual activity: Defer     Birth control/protection: None       Medications:     Current Outpatient Medications:     buprenorphine (SUBUTEX) 8 MG sublingual tablet SL tablet, Place 2 tablets under the tongue Daily., Disp: 60 tablet, Rfl: 0    ibuprofen (ADVIL,MOTRIN) 800 MG tablet, Take 1 tablet by mouth Every 8 (Eight) Hours As Needed for Moderate Pain (Use with food.)., Disp: 90 tablet, Rfl: 2    QUEtiapine (SEROquel) 25 MG tablet, Take 1 tablet by mouth every night at bedtime., Disp: 30 tablet, Rfl: 0    acetaminophen (TYLENOL) 500 MG tablet, Take 1 tablet by mouth Every 6 (Six) Hours As Needed for Mild Pain., Disp: 30 tablet, Rfl: 0    albuterol (ACCUNEB) 1.25 MG/3ML nebulizer solution, Take 3 mL by nebulization Every 6 (Six) Hours As Needed for Wheezing., Disp: 360 mL, Rfl: 5    albuterol sulfate  (90 Base) MCG/ACT inhaler, Inhale 2 puffs Every 6 (Six) Hours As Needed for  Wheezing., Disp: 18 g, Rfl: 1    Creon [id number] units capsule delayed-release particles capsule, , Disp: , Rfl:     gabapentin (NEURONTIN) 800 MG tablet, Take 1 tablet by mouth 4 (Four) Times a Day., Disp: 120 tablet, Rfl: 2    potassium chloride (K-DUR,KLOR-CON) 20 MEQ CR tablet, Take 1 tablet by mouth Daily., Disp: , Rfl:     Symbicort 160-4.5 MCG/ACT inhaler, INHALE TWO PUFFS BY MOUTH TWICE A DAY, Disp: 10.2 g, Rfl: 5    Objective     Physical Exam:  Physical Exam    Vital Signs:   There were no vitals filed for this visit.         There is no height or weight on file to calculate BMI.     MENTAL STATUS EXAM        Assessment / Plan      Diagnoses and all orders for this visit:    1. Opioid dependence in remission (Primary)  -     buprenorphine (SUBUTEX) 8 MG sublingual tablet SL tablet; Place 2 tablets under the tongue Daily.  Dispense: 60 tablet; Refill: 0    2. Cervical spondylosis  -     ibuprofen (ADVIL,MOTRIN) 800 MG tablet; Take 1 tablet by mouth Every 8 (Eight) Hours As Needed for Moderate Pain (Use with food.).  Dispense: 90 tablet; Refill: 2  -     QUEtiapine (SEROquel) 25 MG tablet; Take 1 tablet by mouth every night at bedtime.  Dispense: 30 tablet; Refill: 0  -     Ambulatory Referral to Pain Management    3. Panlobular emphysema           PLAN:  Safety: No acute safety concerns  Risk Assessment: Risk of self-harm acutely is low. Risk of self-harm chronically is also low, but could be further elevated in the event of treatment noncompliance and/or AODA.    TREATMENT PLAN/GOALS: Continue supportive psychotherapy efforts and medications as indicated. Treatment and medication options discussed during today's visit. Patient acknowledged and verbally consented to continue with current treatment plan and was educated on the importance of compliance with treatment and follow-up appointments.    MEDICATION ISSUES:  LILY reviewed as expected.  Discussed medication options and treatment plan of prescribed  medication as well as the risks, benefits, and side effects including potential falls, possible impaired driving and metabolic adversities among others. Patient is agreeable to call the office with any worsening of symptoms or onset of side effects. Patient is agreeable to call 911 or go to the nearest ER should he/she begin having SI/HI. No medication side effects or related complaints today.     Return in about 3 weeks (around 8/21/2025).             This document has been electronically signed by Joe Zhou MD  July 31, 2025 14:07 EDT      Part of this note may be an electronic transcription/translation of spoken language to printed text using the Dragon Dictation System.

## 2025-08-13 ENCOUNTER — TELEMEDICINE (OUTPATIENT)
Age: 50
End: 2025-08-13
Payer: COMMERCIAL

## 2025-08-13 DIAGNOSIS — F17.200 NICOTINE DEPENDENCE WITH CURRENT USE: ICD-10-CM

## 2025-08-13 DIAGNOSIS — F11.21 OPIOID DEPENDENCE IN REMISSION: Primary | ICD-10-CM

## 2025-08-21 ENCOUNTER — OFFICE VISIT (OUTPATIENT)
Age: 50
End: 2025-08-21
Payer: COMMERCIAL

## 2025-08-21 VITALS
SYSTOLIC BLOOD PRESSURE: 114 MMHG | HEIGHT: 65 IN | WEIGHT: 127 LBS | HEART RATE: 68 BPM | BODY MASS INDEX: 21.16 KG/M2 | OXYGEN SATURATION: 100 % | DIASTOLIC BLOOD PRESSURE: 65 MMHG

## 2025-08-21 DIAGNOSIS — F11.21 OPIOID DEPENDENCE IN REMISSION: Primary | ICD-10-CM

## 2025-08-21 DIAGNOSIS — F17.200 NICOTINE DEPENDENCE WITH CURRENT USE: ICD-10-CM

## 2025-08-21 DIAGNOSIS — M47.812 CERVICAL SPONDYLOSIS: ICD-10-CM

## 2025-08-21 DIAGNOSIS — J43.1 PANLOBULAR EMPHYSEMA: ICD-10-CM

## 2025-08-21 RX ORDER — BUPRENORPHINE 8 MG/1
16 TABLET SUBLINGUAL DAILY
Qty: 60 TABLET | Refills: 0 | Status: SHIPPED | OUTPATIENT
Start: 2025-08-21

## 2025-08-21 RX ORDER — QUETIAPINE FUMARATE 50 MG/1
50 TABLET, FILM COATED ORAL NIGHTLY
Qty: 30 TABLET | Refills: 2 | Status: SHIPPED | OUTPATIENT
Start: 2025-08-21

## 2025-08-21 RX ORDER — GABAPENTIN 800 MG/1
800 TABLET ORAL 4 TIMES DAILY
Qty: 120 TABLET | Refills: 2 | Status: SHIPPED | OUTPATIENT
Start: 2025-08-21

## 2025-08-22 LAB
6-ACETYLMORPHINE: NOT DETECTED NG/ML
ALCOHOL, ETHYL: NOT DETECTED MG/DL
AMPHETAMINE: NOT DETECTED NG/ML
BENZODIAZ BLD QL: NOT DETECTED NG/ML
BUPRENORPHINE SAL CFM-MCNC: DETECTED NG/ML
COCAINE METABOLITE: NOT DETECTED NG/ML
CREATININE: 168.3 MG/DL
EDDP SERPL QL: NOT DETECTED NG/ML
FENTANYL-EIA: NOT DETECTED NG/ML
METHAMPHETAMINE: NOT DETECTED NG/ML
OPIATES: NOT DETECTED NG/ML
OXIDANTS: NOT DETECTED UG/ML
OXYCODONE: NOT DETECTED NG/ML
PCP: NOT DETECTED NG/ML
PH: 6.2 (ref 4.5–9)
SPECIFIC GRAVITY, QUAN: 1.03 (ref 1–1.03)
THC: NOT DETECTED NG/ML

## 2025-08-23 LAB — REF LAB TEST METHOD: NORMAL
